# Patient Record
Sex: MALE | Race: WHITE | Employment: OTHER | URBAN - METROPOLITAN AREA
[De-identification: names, ages, dates, MRNs, and addresses within clinical notes are randomized per-mention and may not be internally consistent; named-entity substitution may affect disease eponyms.]

---

## 2018-08-28 ENCOUNTER — HOSPITAL ENCOUNTER (OUTPATIENT)
Age: 83
Discharge: HOME | End: 2018-08-30
Payer: MEDICARE

## 2018-08-28 LAB — PROSTATE SPECIFIC ANTIGEN: 2.45 NG/ML (ref 0–4)

## 2018-08-28 PROCEDURE — 84153 ASSAY OF PSA TOTAL: CPT

## 2019-02-19 ENCOUNTER — HOSPITAL ENCOUNTER (OUTPATIENT)
Age: 84
Discharge: HOME | End: 2019-02-21
Payer: MEDICARE

## 2019-02-19 PROCEDURE — G0103 PSA SCREENING: HCPCS

## 2019-02-19 PROCEDURE — 84153 ASSAY OF PSA TOTAL: CPT

## 2019-03-04 LAB
PROSTATE SPECIFIC ANTIGEN: 1.61 NG/ML (ref 0–4)
PROSTATE SPECIFIC ANTIGEN: ABNORMAL NG/ML (ref 0–4)

## 2020-01-01 ENCOUNTER — HOSPITAL ENCOUNTER (OUTPATIENT)
Dept: RADIATION ONCOLOGY | Age: 85
Discharge: HOME | End: 2020-05-14
Attending: RADIOLOGY
Payer: MEDICARE

## 2020-01-01 ENCOUNTER — ANESTHESIA EVENT (OUTPATIENT)
Dept: CT IMAGING | Age: 85
End: 2020-01-01

## 2020-01-01 ENCOUNTER — HOSPITAL ENCOUNTER (OUTPATIENT)
Dept: RADIATION ONCOLOGY | Age: 85
End: 2020-01-01
Attending: RADIOLOGY
Payer: MEDICARE

## 2020-01-01 ENCOUNTER — APPOINTMENT (OUTPATIENT)
Dept: RADIATION ONCOLOGY | Age: 85
End: 2020-01-01
Attending: RADIOLOGY
Payer: MEDICARE

## 2020-01-01 ENCOUNTER — HOSPITAL ENCOUNTER (OUTPATIENT)
Dept: RADIATION ONCOLOGY | Age: 85
Discharge: HOME | End: 2020-05-04
Attending: RADIOLOGY
Payer: MEDICARE

## 2020-01-01 ENCOUNTER — HOSPITAL ENCOUNTER (OUTPATIENT)
Dept: RADIATION ONCOLOGY | Age: 85
Discharge: HOME | End: 2020-04-01
Attending: RADIOLOGY
Payer: MEDICARE

## 2020-01-01 ENCOUNTER — HOSPITAL ENCOUNTER (OUTPATIENT)
Dept: RADIATION ONCOLOGY | Age: 85
Discharge: HOME | End: 2020-04-30
Attending: RADIOLOGY
Payer: MEDICARE

## 2020-01-01 ENCOUNTER — CLINICAL DOCUMENTATION (OUTPATIENT)
Dept: RADIATION ONCOLOGY | Age: 85
End: 2020-01-01

## 2020-01-01 ENCOUNTER — HOSPITAL ENCOUNTER (OUTPATIENT)
Dept: CT IMAGING | Age: 85
Discharge: HOME | End: 2020-04-05
Payer: MEDICARE

## 2020-01-01 ENCOUNTER — HOSPITAL ENCOUNTER (OUTPATIENT)
Dept: RADIATION ONCOLOGY | Age: 85
Discharge: HOME | End: 2020-05-20
Attending: RADIOLOGY
Payer: MEDICARE

## 2020-01-01 ENCOUNTER — HOSPITAL ENCOUNTER (OUTPATIENT)
Dept: CT IMAGING | Age: 85
Discharge: HOME | End: 2020-03-05
Payer: MEDICARE

## 2020-01-01 ENCOUNTER — HOSPITAL ENCOUNTER (OUTPATIENT)
Dept: RADIATION ONCOLOGY | Age: 85
Discharge: HOME | End: 2020-05-27
Attending: RADIOLOGY
Payer: MEDICARE

## 2020-01-01 ENCOUNTER — TELEPHONE (OUTPATIENT)
Dept: RADIATION ONCOLOGY | Age: 85
End: 2020-01-01

## 2020-01-01 ENCOUNTER — HOSPITAL ENCOUNTER (OUTPATIENT)
Dept: RADIATION ONCOLOGY | Age: 85
Discharge: HOME | End: 2020-04-27
Attending: RADIOLOGY
Payer: MEDICARE

## 2020-01-01 ENCOUNTER — TELEPHONE (OUTPATIENT)
Dept: CASE MANAGEMENT | Age: 85
End: 2020-01-01

## 2020-01-01 ENCOUNTER — HOSPITAL ENCOUNTER (OUTPATIENT)
Dept: RADIATION ONCOLOGY | Age: 85
Discharge: HOME | End: 2020-05-11
Attending: RADIOLOGY
Payer: MEDICARE

## 2020-01-01 ENCOUNTER — HOSPITAL ENCOUNTER (OUTPATIENT)
Dept: RADIATION ONCOLOGY | Age: 85
Discharge: HOME | End: 2020-02-18
Payer: MEDICARE

## 2020-01-01 ENCOUNTER — CARE COORDINATION (OUTPATIENT)
Dept: CASE MANAGEMENT | Age: 85
End: 2020-01-01

## 2020-01-01 ENCOUNTER — HOSPITAL ENCOUNTER (OUTPATIENT)
Dept: RADIATION ONCOLOGY | Age: 85
Discharge: HOME | End: 2020-05-19
Attending: RADIOLOGY
Payer: MEDICARE

## 2020-01-01 ENCOUNTER — HOSPITAL ENCOUNTER (OUTPATIENT)
Dept: RADIATION ONCOLOGY | Age: 85
Discharge: HOME | End: 2020-04-29
Attending: RADIOLOGY
Payer: MEDICARE

## 2020-01-01 ENCOUNTER — HOSPITAL ENCOUNTER (OUTPATIENT)
Dept: RADIATION ONCOLOGY | Age: 85
Discharge: HOME | End: 2020-05-06
Attending: RADIOLOGY
Payer: MEDICARE

## 2020-01-01 ENCOUNTER — APPOINTMENT (OUTPATIENT)
Dept: GENERAL RADIOLOGY | Age: 85
DRG: 637 | End: 2020-01-01
Payer: MEDICARE

## 2020-01-01 ENCOUNTER — HOSPITAL ENCOUNTER (OUTPATIENT)
Dept: RADIATION ONCOLOGY | Age: 85
Discharge: HOME | End: 2020-05-05
Attending: RADIOLOGY
Payer: MEDICARE

## 2020-01-01 ENCOUNTER — HOSPITAL ENCOUNTER (INPATIENT)
Age: 85
LOS: 5 days | Discharge: HOME | DRG: 637 | End: 2020-04-22
Attending: EMERGENCY MEDICINE | Admitting: FAMILY MEDICINE
Payer: MEDICARE

## 2020-01-01 ENCOUNTER — HOSPITAL ENCOUNTER (OUTPATIENT)
Dept: RADIATION ONCOLOGY | Age: 85
Discharge: HOME | End: 2020-05-07
Attending: RADIOLOGY
Payer: MEDICARE

## 2020-01-01 ENCOUNTER — HOSPITAL ENCOUNTER (OUTPATIENT)
Dept: RADIATION ONCOLOGY | Age: 85
Discharge: HOME | End: 2020-05-08
Attending: RADIOLOGY
Payer: MEDICARE

## 2020-01-01 ENCOUNTER — HOSPITAL ENCOUNTER (OUTPATIENT)
Dept: RADIATION ONCOLOGY | Age: 85
Discharge: HOME | End: 2020-05-15
Attending: RADIOLOGY
Payer: MEDICARE

## 2020-01-01 ENCOUNTER — HOSPITAL ENCOUNTER (OUTPATIENT)
Dept: RADIATION ONCOLOGY | Age: 85
Discharge: HOME | End: 2020-05-01
Attending: RADIOLOGY
Payer: MEDICARE

## 2020-01-01 ENCOUNTER — HOSPITAL ENCOUNTER (OUTPATIENT)
Dept: RADIATION ONCOLOGY | Age: 85
Discharge: HOME | End: 2020-05-18
Attending: RADIOLOGY
Payer: MEDICARE

## 2020-01-01 ENCOUNTER — APPOINTMENT (OUTPATIENT)
Dept: ULTRASOUND IMAGING | Age: 85
DRG: 637 | End: 2020-01-01
Payer: MEDICARE

## 2020-01-01 ENCOUNTER — HOSPITAL ENCOUNTER (OUTPATIENT)
Dept: RADIATION ONCOLOGY | Age: 85
Discharge: HOME | End: 2020-05-22
Attending: RADIOLOGY
Payer: MEDICARE

## 2020-01-01 ENCOUNTER — HOSPITAL ENCOUNTER (OUTPATIENT)
Dept: GENERAL RADIOLOGY | Age: 85
Discharge: HOME | End: 2020-03-13
Payer: MEDICARE

## 2020-01-01 ENCOUNTER — HOSPITAL ENCOUNTER (OUTPATIENT)
Dept: RADIATION ONCOLOGY | Age: 85
Discharge: HOME | End: 2020-05-13
Attending: RADIOLOGY
Payer: MEDICARE

## 2020-01-01 ENCOUNTER — HOSPITAL ENCOUNTER (OUTPATIENT)
Dept: CT IMAGING | Age: 85
Discharge: HOME | End: 2020-03-13
Payer: MEDICARE

## 2020-01-01 ENCOUNTER — HOSPITAL ENCOUNTER (OUTPATIENT)
Dept: NUCLEAR MEDICINE | Age: 85
Discharge: HOME | End: 2020-02-27
Payer: MEDICARE

## 2020-01-01 ENCOUNTER — APPOINTMENT (OUTPATIENT)
Dept: CT IMAGING | Age: 85
DRG: 637 | End: 2020-01-01
Payer: MEDICARE

## 2020-01-01 ENCOUNTER — HOSPITAL ENCOUNTER (OUTPATIENT)
Dept: RADIATION ONCOLOGY | Age: 85
Discharge: HOME | End: 2020-05-21
Attending: RADIOLOGY
Payer: MEDICARE

## 2020-01-01 ENCOUNTER — HOSPITAL ENCOUNTER (OUTPATIENT)
Dept: RADIATION ONCOLOGY | Age: 85
Discharge: HOME | End: 2020-05-12
Attending: RADIOLOGY
Payer: MEDICARE

## 2020-01-01 ENCOUNTER — ANESTHESIA (OUTPATIENT)
Dept: CT IMAGING | Age: 85
End: 2020-01-01

## 2020-01-01 VITALS
HEIGHT: 72 IN | TEMPERATURE: 96.4 F | WEIGHT: 211 LBS | HEART RATE: 81 BPM | SYSTOLIC BLOOD PRESSURE: 134 MMHG | RESPIRATION RATE: 18 BRPM | DIASTOLIC BLOOD PRESSURE: 87 MMHG | OXYGEN SATURATION: 96 % | BODY MASS INDEX: 28.58 KG/M2

## 2020-01-01 VITALS
HEART RATE: 73 BPM | OXYGEN SATURATION: 96 % | WEIGHT: 211.13 LBS | SYSTOLIC BLOOD PRESSURE: 124 MMHG | TEMPERATURE: 98.2 F | BODY MASS INDEX: 28.63 KG/M2 | DIASTOLIC BLOOD PRESSURE: 60 MMHG | RESPIRATION RATE: 20 BRPM

## 2020-01-01 VITALS
TEMPERATURE: 97.1 F | RESPIRATION RATE: 20 BRPM | HEART RATE: 67 BPM | OXYGEN SATURATION: 97 % | WEIGHT: 206 LBS | DIASTOLIC BLOOD PRESSURE: 56 MMHG | BODY MASS INDEX: 27.94 KG/M2 | SYSTOLIC BLOOD PRESSURE: 110 MMHG

## 2020-01-01 VITALS
TEMPERATURE: 98.5 F | HEART RATE: 86 BPM | BODY MASS INDEX: 28.25 KG/M2 | RESPIRATION RATE: 20 BRPM | DIASTOLIC BLOOD PRESSURE: 54 MMHG | WEIGHT: 208.31 LBS | OXYGEN SATURATION: 95 % | SYSTOLIC BLOOD PRESSURE: 110 MMHG

## 2020-01-01 VITALS
BODY MASS INDEX: 28.36 KG/M2 | TEMPERATURE: 96.6 F | OXYGEN SATURATION: 96 % | DIASTOLIC BLOOD PRESSURE: 56 MMHG | WEIGHT: 209.13 LBS | SYSTOLIC BLOOD PRESSURE: 102 MMHG | HEART RATE: 91 BPM | RESPIRATION RATE: 20 BRPM

## 2020-01-01 VITALS
HEIGHT: 72 IN | WEIGHT: 211 LBS | BODY MASS INDEX: 28.58 KG/M2 | DIASTOLIC BLOOD PRESSURE: 71 MMHG | HEART RATE: 75 BPM | RESPIRATION RATE: 18 BRPM | TEMPERATURE: 97.7 F | SYSTOLIC BLOOD PRESSURE: 147 MMHG | OXYGEN SATURATION: 96 %

## 2020-01-01 VITALS
SYSTOLIC BLOOD PRESSURE: 153 MMHG | HEIGHT: 72 IN | OXYGEN SATURATION: 97 % | BODY MASS INDEX: 28.58 KG/M2 | DIASTOLIC BLOOD PRESSURE: 86 MMHG | RESPIRATION RATE: 20 BRPM | WEIGHT: 211 LBS | HEART RATE: 107 BPM | TEMPERATURE: 98.1 F

## 2020-01-01 VITALS
DIASTOLIC BLOOD PRESSURE: 58 MMHG | OXYGEN SATURATION: 92 % | SYSTOLIC BLOOD PRESSURE: 112 MMHG | BODY MASS INDEX: 28.63 KG/M2 | WEIGHT: 211.13 LBS | HEART RATE: 87 BPM | TEMPERATURE: 97.5 F | RESPIRATION RATE: 20 BRPM

## 2020-01-01 VITALS — DIASTOLIC BLOOD PRESSURE: 86 MMHG | SYSTOLIC BLOOD PRESSURE: 136 MMHG | OXYGEN SATURATION: 96 %

## 2020-01-01 LAB
ALBUMIN SERPL-MCNC: 3.2 G/DL (ref 3.5–5.2)
ALBUMIN SERPL-MCNC: 3.3 G/DL (ref 3.5–5.2)
ALBUMIN SERPL-MCNC: 3.3 G/DL (ref 3.5–5.2)
ALBUMIN SERPL-MCNC: 3.5 G/DL (ref 3.5–5.2)
ALBUMIN SERPL-MCNC: 3.6 G/DL (ref 3.5–5.2)
ALBUMIN SERPL-MCNC: 3.6 G/DL (ref 3.5–5.2)
ALP BLD-CCNC: 43 U/L (ref 40–129)
ALP BLD-CCNC: 46 U/L (ref 40–129)
ALP BLD-CCNC: 51 U/L (ref 40–129)
ALP BLD-CCNC: 58 U/L (ref 40–129)
ALP BLD-CCNC: 59 U/L (ref 40–129)
ALP BLD-CCNC: 59 U/L (ref 40–129)
ALT SERPL-CCNC: 12 U/L (ref 0–40)
ALT SERPL-CCNC: 13 U/L (ref 0–40)
ALT SERPL-CCNC: 15 U/L (ref 0–40)
ALT SERPL-CCNC: 18 U/L (ref 0–40)
ANION GAP SERPL CALCULATED.3IONS-SCNC: 10 MMOL/L (ref 7–16)
ANION GAP SERPL CALCULATED.3IONS-SCNC: 11 MMOL/L (ref 7–16)
ANION GAP SERPL CALCULATED.3IONS-SCNC: 8 MMOL/L (ref 7–16)
ANISOCYTOSIS: ABNORMAL
APTT: 34.6 SEC (ref 24.5–35.1)
AST SERPL-CCNC: 15 U/L (ref 0–39)
AST SERPL-CCNC: 17 U/L (ref 0–39)
AST SERPL-CCNC: 17 U/L (ref 0–39)
AST SERPL-CCNC: 19 U/L (ref 0–39)
AST SERPL-CCNC: 19 U/L (ref 0–39)
AST SERPL-CCNC: 23 U/L (ref 0–39)
B.E.: -5.9 MMOL/L (ref -3–3)
B.E.: -6.7 MMOL/L (ref -3–3)
B.E.: -9.4 MMOL/L (ref -3–3)
BACTERIA: NORMAL /HPF
BASOPHILS ABSOLUTE: 0 E9/L (ref 0–0.2)
BASOPHILS ABSOLUTE: 0.01 E9/L (ref 0–0.2)
BASOPHILS ABSOLUTE: 0.01 E9/L (ref 0–0.2)
BASOPHILS ABSOLUTE: 0.08 E9/L (ref 0–0.2)
BASOPHILS RELATIVE PERCENT: 0 % (ref 0–2)
BASOPHILS RELATIVE PERCENT: 0 % (ref 0–2)
BASOPHILS RELATIVE PERCENT: 0.1 % (ref 0–2)
BASOPHILS RELATIVE PERCENT: 0.1 % (ref 0–2)
BASOPHILS RELATIVE PERCENT: 0.2 % (ref 0–2)
BASOPHILS RELATIVE PERCENT: 1 % (ref 0–2)
BILIRUB SERPL-MCNC: 0.3 MG/DL (ref 0–1.2)
BILIRUB SERPL-MCNC: 0.4 MG/DL (ref 0–1.2)
BILIRUB SERPL-MCNC: 0.4 MG/DL (ref 0–1.2)
BILIRUB SERPL-MCNC: 0.5 MG/DL (ref 0–1.2)
BILIRUB SERPL-MCNC: 0.5 MG/DL (ref 0–1.2)
BILIRUB SERPL-MCNC: 0.7 MG/DL (ref 0–1.2)
BILIRUBIN DIRECT: 0.3 MG/DL (ref 0–0.3)
BILIRUBIN URINE: NEGATIVE
BILIRUBIN, INDIRECT: 0.1 MG/DL (ref 0–1)
BLOOD CULTURE, ROUTINE: NORMAL
BLOOD, URINE: NEGATIVE
BUN BLDV-MCNC: 23 MG/DL (ref 8–23)
BUN BLDV-MCNC: 51 MG/DL (ref 8–23)
BUN BLDV-MCNC: 53 MG/DL (ref 8–23)
BUN BLDV-MCNC: 55 MG/DL (ref 8–23)
BUN BLDV-MCNC: 57 MG/DL (ref 8–23)
BUN BLDV-MCNC: 58 MG/DL (ref 8–23)
BUN BLDV-MCNC: 60 MG/DL (ref 8–23)
BUN BLDV-MCNC: 68 MG/DL (ref 8–23)
BURR CELLS: ABNORMAL
CALCIUM SERPL-MCNC: 10.2 MG/DL (ref 8.6–10.2)
CALCIUM SERPL-MCNC: 8.7 MG/DL (ref 8.6–10.2)
CALCIUM SERPL-MCNC: 8.8 MG/DL (ref 8.6–10.2)
CALCIUM SERPL-MCNC: 9 MG/DL (ref 8.6–10.2)
CALCIUM SERPL-MCNC: 9.1 MG/DL (ref 8.6–10.2)
CALCIUM SERPL-MCNC: 9.1 MG/DL (ref 8.6–10.2)
CALCIUM SERPL-MCNC: 9.3 MG/DL (ref 8.6–10.2)
CALCIUM SERPL-MCNC: 9.8 MG/DL (ref 8.6–10.2)
CHLORIDE BLD-SCNC: 101 MMOL/L (ref 98–107)
CHLORIDE BLD-SCNC: 101 MMOL/L (ref 98–107)
CHLORIDE BLD-SCNC: 106 MMOL/L (ref 98–107)
CHLORIDE BLD-SCNC: 106 MMOL/L (ref 98–107)
CHLORIDE BLD-SCNC: 94 MMOL/L (ref 98–107)
CHLORIDE BLD-SCNC: 97 MMOL/L (ref 98–107)
CHLORIDE BLD-SCNC: 98 MMOL/L (ref 98–107)
CHLORIDE BLD-SCNC: 99 MMOL/L (ref 98–107)
CLARITY: CLEAR
CO2: 21 MMOL/L (ref 22–29)
CO2: 22 MMOL/L (ref 22–29)
CO2: 22 MMOL/L (ref 22–29)
CO2: 23 MMOL/L (ref 22–29)
CO2: 25 MMOL/L (ref 22–29)
CO2: 26 MMOL/L (ref 22–29)
CO2: 29 MMOL/L (ref 22–29)
CO2: 33 MMOL/L (ref 22–29)
COHB: 0.3 % (ref 0–1.5)
COHB: 0.4 % (ref 0–1.5)
COHB: 0.9 % (ref 0–1.5)
COHB: 0.9 % (ref 0–1.5)
COLOR: YELLOW
COMMENT: ABNORMAL
CREAT SERPL-MCNC: 1.3 MG/DL (ref 0.7–1.2)
CREAT SERPL-MCNC: 1.6 MG/DL (ref 0.7–1.2)
CREAT SERPL-MCNC: 1.8 MG/DL (ref 0.7–1.2)
CREAT SERPL-MCNC: 1.8 MG/DL (ref 0.7–1.2)
CREAT SERPL-MCNC: 1.9 MG/DL (ref 0.7–1.2)
CREAT SERPL-MCNC: 2 MG/DL (ref 0.7–1.2)
CREAT SERPL-MCNC: 2.1 MG/DL (ref 0.7–1.2)
CREAT SERPL-MCNC: 2.2 MG/DL (ref 0.7–1.2)
CRITICAL: ABNORMAL
CULTURE, BLOOD 2: NORMAL
D DIMER: 2794 NG/ML DDU
DATE ANALYZED: ABNORMAL
DATE OF COLLECTION: ABNORMAL
EKG ATRIAL RATE: 101 BPM
EKG ATRIAL RATE: 129 BPM
EKG ATRIAL RATE: 75 BPM
EKG ATRIAL RATE: 96 BPM
EKG P AXIS: 2 DEGREES
EKG P AXIS: 53 DEGREES
EKG P AXIS: 80 DEGREES
EKG P-R INTERVAL: 100 MS
EKG P-R INTERVAL: 166 MS
EKG P-R INTERVAL: 186 MS
EKG P-R INTERVAL: 252 MS
EKG Q-T INTERVAL: 284 MS
EKG Q-T INTERVAL: 304 MS
EKG Q-T INTERVAL: 312 MS
EKG Q-T INTERVAL: 366 MS
EKG QRS DURATION: 100 MS
EKG QRS DURATION: 84 MS
EKG QRS DURATION: 86 MS
EKG QRS DURATION: 94 MS
EKG QTC CALCULATION (BAZETT): 384 MS
EKG QTC CALCULATION (BAZETT): 404 MS
EKG QTC CALCULATION (BAZETT): 408 MS
EKG QTC CALCULATION (BAZETT): 412 MS
EKG R AXIS: 29 DEGREES
EKG R AXIS: 36 DEGREES
EKG R AXIS: 44 DEGREES
EKG R AXIS: 74 DEGREES
EKG T AXIS: 108 DEGREES
EKG T AXIS: 110 DEGREES
EKG T AXIS: 117 DEGREES
EKG T AXIS: 134 DEGREES
EKG VENTRICULAR RATE: 101 BPM
EKG VENTRICULAR RATE: 127 BPM
EKG VENTRICULAR RATE: 75 BPM
EKG VENTRICULAR RATE: 96 BPM
EOSINOPHILS ABSOLUTE: 0 E9/L (ref 0.05–0.5)
EOSINOPHILS ABSOLUTE: 0.01 E9/L (ref 0.05–0.5)
EOSINOPHILS ABSOLUTE: 0.02 E9/L (ref 0.05–0.5)
EOSINOPHILS ABSOLUTE: 0.19 E9/L (ref 0.05–0.5)
EOSINOPHILS RELATIVE PERCENT: 0 % (ref 0–6)
EOSINOPHILS RELATIVE PERCENT: 0.2 % (ref 0–6)
EOSINOPHILS RELATIVE PERCENT: 0.2 % (ref 0–6)
EOSINOPHILS RELATIVE PERCENT: 2.4 % (ref 0–6)
GFR AFRICAN AMERICAN: 35
GFR AFRICAN AMERICAN: 36
GFR AFRICAN AMERICAN: 39
GFR AFRICAN AMERICAN: 41
GFR AFRICAN AMERICAN: 44
GFR AFRICAN AMERICAN: 44
GFR AFRICAN AMERICAN: 50
GFR AFRICAN AMERICAN: >60
GFR NON-AFRICAN AMERICAN: 29 ML/MIN/1.73
GFR NON-AFRICAN AMERICAN: 30 ML/MIN/1.73
GFR NON-AFRICAN AMERICAN: 32 ML/MIN/1.73
GFR NON-AFRICAN AMERICAN: 34 ML/MIN/1.73
GFR NON-AFRICAN AMERICAN: 36 ML/MIN/1.73
GFR NON-AFRICAN AMERICAN: 36 ML/MIN/1.73
GFR NON-AFRICAN AMERICAN: 41 ML/MIN/1.73
GFR NON-AFRICAN AMERICAN: 52 ML/MIN/1.73
GLUCOSE BLD-MCNC: 152 MG/DL (ref 74–99)
GLUCOSE BLD-MCNC: 153 MG/DL (ref 74–99)
GLUCOSE BLD-MCNC: 174 MG/DL (ref 74–99)
GLUCOSE BLD-MCNC: 178 MG/DL (ref 74–99)
GLUCOSE BLD-MCNC: 211 MG/DL (ref 74–99)
GLUCOSE BLD-MCNC: 65 MG/DL (ref 74–99)
GLUCOSE BLD-MCNC: 84 MG/DL (ref 74–99)
GLUCOSE BLD-MCNC: 92 MG/DL (ref 74–99)
GLUCOSE URINE: NEGATIVE MG/DL
HCO3: 20.4 MMOL/L (ref 22–26)
HCO3: 21.4 MMOL/L (ref 22–26)
HCO3: 23 MMOL/L (ref 22–26)
HCT VFR BLD CALC: 31.9 % (ref 37–54)
HCT VFR BLD CALC: 34.7 % (ref 37–54)
HCT VFR BLD CALC: 35.1 % (ref 37–54)
HCT VFR BLD CALC: 36.1 % (ref 37–54)
HCT VFR BLD CALC: 37.5 % (ref 37–54)
HCT VFR BLD CALC: 39 % (ref 37–54)
HCT VFR BLD CALC: 40.3 % (ref 37–54)
HEMOGLOBIN: 10 G/DL (ref 12.5–16.5)
HEMOGLOBIN: 10.7 G/DL (ref 12.5–16.5)
HEMOGLOBIN: 10.8 G/DL (ref 12.5–16.5)
HEMOGLOBIN: 11.1 G/DL (ref 12.5–16.5)
HEMOGLOBIN: 11.4 G/DL (ref 12.5–16.5)
HEMOGLOBIN: 11.5 G/DL (ref 12.5–16.5)
HEMOGLOBIN: 12.6 G/DL (ref 12.5–16.5)
HHB: 0.3 % (ref 0–5)
HHB: 0.5 % (ref 0–5)
HHB: 0.6 % (ref 0–5)
HHB: 4.8 % (ref 0–5)
HYPOCHROMIA: ABNORMAL
IMMATURE GRANULOCYTES #: 0.04 E9/L
IMMATURE GRANULOCYTES #: 0.08 E9/L
IMMATURE GRANULOCYTES #: 0.09 E9/L
IMMATURE GRANULOCYTES #: 0.09 E9/L
IMMATURE GRANULOCYTES %: 0.6 % (ref 0–5)
IMMATURE GRANULOCYTES %: 0.8 % (ref 0–5)
IMMATURE GRANULOCYTES %: 0.9 % (ref 0–5)
IMMATURE GRANULOCYTES %: 1.1 % (ref 0–5)
INR BLD: 1.1
INR BLD: 1.2
KETONES, URINE: NEGATIVE MG/DL
LAB: ABNORMAL
LACTIC ACID, SEPSIS: 1 MMOL/L (ref 0.5–1.9)
LACTIC ACID, SEPSIS: 1.3 MMOL/L (ref 0.5–1.9)
LACTIC ACID: 1 MMOL/L (ref 0.5–2.2)
LEUKOCYTE ESTERASE, URINE: NEGATIVE
LIPASE: 55 U/L (ref 13–60)
LV EF: 43 %
LVEF MODALITY: NORMAL
LYMPHOCYTES ABSOLUTE: 0.08 E9/L (ref 1.5–4)
LYMPHOCYTES ABSOLUTE: 0.38 E9/L (ref 1.5–4)
LYMPHOCYTES ABSOLUTE: 0.44 E9/L (ref 1.5–4)
LYMPHOCYTES ABSOLUTE: 0.45 E9/L (ref 1.5–4)
LYMPHOCYTES ABSOLUTE: 1.04 E9/L (ref 1.5–4)
LYMPHOCYTES ABSOLUTE: 1.32 E9/L (ref 1.5–4)
LYMPHOCYTES RELATIVE PERCENT: 0.9 % (ref 20–42)
LYMPHOCYTES RELATIVE PERCENT: 10.3 % (ref 20–42)
LYMPHOCYTES RELATIVE PERCENT: 16.4 % (ref 20–42)
LYMPHOCYTES RELATIVE PERCENT: 3.9 % (ref 20–42)
LYMPHOCYTES RELATIVE PERCENT: 4.4 % (ref 20–42)
LYMPHOCYTES RELATIVE PERCENT: 7 % (ref 20–42)
Lab: ABNORMAL
MAGNESIUM: 1.8 MG/DL (ref 1.6–2.6)
MCH RBC QN AUTO: 28.8 PG (ref 26–35)
MCH RBC QN AUTO: 29.2 PG (ref 26–35)
MCH RBC QN AUTO: 29.3 PG (ref 26–35)
MCH RBC QN AUTO: 29.3 PG (ref 26–35)
MCH RBC QN AUTO: 29.4 PG (ref 26–35)
MCH RBC QN AUTO: 29.6 PG (ref 26–35)
MCH RBC QN AUTO: 29.6 PG (ref 26–35)
MCHC RBC AUTO-ENTMCNC: 29.5 % (ref 32–34.5)
MCHC RBC AUTO-ENTMCNC: 29.6 % (ref 32–34.5)
MCHC RBC AUTO-ENTMCNC: 30.5 % (ref 32–34.5)
MCHC RBC AUTO-ENTMCNC: 31.1 % (ref 32–34.5)
MCHC RBC AUTO-ENTMCNC: 31.3 % (ref 32–34.5)
MCHC RBC AUTO-ENTMCNC: 31.3 % (ref 32–34.5)
MCHC RBC AUTO-ENTMCNC: 31.6 % (ref 32–34.5)
MCV RBC AUTO: 93.5 FL (ref 80–99.9)
MCV RBC AUTO: 93.5 FL (ref 80–99.9)
MCV RBC AUTO: 93.8 FL (ref 80–99.9)
MCV RBC AUTO: 94 FL (ref 80–99.9)
MCV RBC AUTO: 97.2 FL (ref 80–99.9)
MCV RBC AUTO: 97.5 FL (ref 80–99.9)
MCV RBC AUTO: 99.2 FL (ref 80–99.9)
METER GLUCOSE: 100 MG/DL (ref 74–99)
METER GLUCOSE: 102 MG/DL (ref 74–99)
METER GLUCOSE: 106 MG/DL (ref 74–99)
METER GLUCOSE: 114 MG/DL (ref 74–99)
METER GLUCOSE: 117 MG/DL (ref 74–99)
METER GLUCOSE: 121 MG/DL (ref 74–99)
METER GLUCOSE: 142 MG/DL (ref 74–99)
METER GLUCOSE: 164 MG/DL (ref 74–99)
METER GLUCOSE: 187 MG/DL (ref 74–99)
METER GLUCOSE: 191 MG/DL (ref 74–99)
METER GLUCOSE: 204 MG/DL (ref 74–99)
METER GLUCOSE: 234 MG/DL (ref 74–99)
METER GLUCOSE: 255 MG/DL (ref 74–99)
METER GLUCOSE: 257 MG/DL (ref 74–99)
METER GLUCOSE: 280 MG/DL (ref 74–99)
METER GLUCOSE: 300 MG/DL (ref 74–99)
METER GLUCOSE: 52 MG/DL (ref 74–99)
METER GLUCOSE: 58 MG/DL (ref 74–99)
METER GLUCOSE: 62 MG/DL (ref 74–99)
METER GLUCOSE: 67 MG/DL (ref 74–99)
METER GLUCOSE: 69 MG/DL (ref 74–99)
METER GLUCOSE: 72 MG/DL (ref 74–99)
METER GLUCOSE: 73 MG/DL (ref 74–99)
METER GLUCOSE: 77 MG/DL (ref 74–99)
METER GLUCOSE: 78 MG/DL (ref 74–99)
METER GLUCOSE: 81 MG/DL (ref 74–99)
METER GLUCOSE: 82 MG/DL (ref 74–99)
METER GLUCOSE: 84 MG/DL (ref 74–99)
METER GLUCOSE: 92 MG/DL (ref 74–99)
METER GLUCOSE: 93 MG/DL (ref 74–99)
METER GLUCOSE: <40 MG/DL (ref 74–99)
METHB: 0.2 % (ref 0–1.5)
METHB: 0.2 % (ref 0–1.5)
METHB: 0.3 % (ref 0–1.5)
METHB: 0.3 % (ref 0–1.5)
MODE: ABNORMAL
MONOCYTES ABSOLUTE: 0.12 E9/L (ref 0.1–0.95)
MONOCYTES ABSOLUTE: 0.23 E9/L (ref 0.1–0.95)
MONOCYTES ABSOLUTE: 0.36 E9/L (ref 0.1–0.95)
MONOCYTES ABSOLUTE: 0.73 E9/L (ref 0.1–0.95)
MONOCYTES ABSOLUTE: 1.09 E9/L (ref 0.1–0.95)
MONOCYTES ABSOLUTE: 1.23 E9/L (ref 0.1–0.95)
MONOCYTES RELATIVE PERCENT: 1.9 % (ref 2–12)
MONOCYTES RELATIVE PERCENT: 12.2 % (ref 2–12)
MONOCYTES RELATIVE PERCENT: 2.6 % (ref 2–12)
MONOCYTES RELATIVE PERCENT: 3.7 % (ref 2–12)
MONOCYTES RELATIVE PERCENT: 9.1 % (ref 2–12)
MONOCYTES RELATIVE PERCENT: 9.6 % (ref 2–12)
NEUTROPHILS ABSOLUTE: 5.65 E9/L (ref 1.8–7.3)
NEUTROPHILS ABSOLUTE: 5.83 E9/L (ref 1.8–7.3)
NEUTROPHILS ABSOLUTE: 7.47 E9/L (ref 1.8–7.3)
NEUTROPHILS ABSOLUTE: 7.68 E9/L (ref 1.8–7.3)
NEUTROPHILS ABSOLUTE: 8.96 E9/L (ref 1.8–7.3)
NEUTROPHILS ABSOLUTE: 9.37 E9/L (ref 1.8–7.3)
NEUTROPHILS RELATIVE PERCENT: 70 % (ref 43–80)
NEUTROPHILS RELATIVE PERCENT: 76.4 % (ref 43–80)
NEUTROPHILS RELATIVE PERCENT: 86 % (ref 43–80)
NEUTROPHILS RELATIVE PERCENT: 90.1 % (ref 43–80)
NEUTROPHILS RELATIVE PERCENT: 91.5 % (ref 43–80)
NEUTROPHILS RELATIVE PERCENT: 96.5 % (ref 43–80)
NITRITE, URINE: NEGATIVE
O2 CONTENT: 15.5 ML/DL
O2 CONTENT: 17.5 ML/DL
O2 CONTENT: 17.7 ML/DL
O2 CONTENT: 17.9 ML/DL
O2 SATURATION: 95.2 % (ref 92–98.5)
O2 SATURATION: 99.4 % (ref 92–98.5)
O2 SATURATION: 99.5 % (ref 92–98.5)
O2 SATURATION: 99.7 % (ref 92–98.5)
O2HB: 94.6 % (ref 94–97)
O2HB: 98.3 % (ref 94–97)
O2HB: 98.3 % (ref 94–97)
O2HB: 99.1 % (ref 94–97)
OPERATOR ID: 2860
OPERATOR ID: 659
OVALOCYTES: ABNORMAL
PATIENT TEMP: 37 C
PCO2: 47.4 MMHG (ref 35–45)
PCO2: 62.7 MMHG (ref 35–45)
PCO2: 72.6 MMHG (ref 35–45)
PCO2: ABNORMAL MMHG (ref 35–45)
PDW BLD-RTO: 13.6 FL (ref 11.5–15)
PDW BLD-RTO: 13.7 FL (ref 11.5–15)
PDW BLD-RTO: 13.9 FL (ref 11.5–15)
PDW BLD-RTO: 14.1 FL (ref 11.5–15)
PDW BLD-RTO: 14.1 FL (ref 11.5–15)
PEEP/CPAP: 10 CMH2O
PH BLOOD GAS: 7.09 (ref 7.35–7.45)
PH BLOOD GAS: 7.18 (ref 7.35–7.45)
PH BLOOD GAS: 7.25 (ref 7.35–7.45)
PH BLOOD GAS: ABNORMAL (ref 7.35–7.45)
PH UA: 5 (ref 5–9)
PLATELET # BLD: 171 E9/L (ref 130–450)
PLATELET # BLD: 180 E9/L (ref 130–450)
PLATELET # BLD: 181 E9/L (ref 130–450)
PLATELET # BLD: 187 E9/L (ref 130–450)
PLATELET # BLD: 205 E9/L (ref 130–450)
PLATELET # BLD: 227 E9/L (ref 130–450)
PLATELET # BLD: 232 E9/L (ref 130–450)
PMV BLD AUTO: 10 FL (ref 7–12)
PMV BLD AUTO: 10.2 FL (ref 7–12)
PMV BLD AUTO: 10.4 FL (ref 7–12)
PMV BLD AUTO: 10.6 FL (ref 7–12)
PMV BLD AUTO: 10.7 FL (ref 7–12)
PMV BLD AUTO: 10.9 FL (ref 7–12)
PMV BLD AUTO: 9.7 FL (ref 7–12)
PO2: 243 MMHG (ref 75–100)
PO2: 272.5 MMHG (ref 75–100)
PO2: 350.7 MMHG (ref 75–100)
PO2: 75 MMHG (ref 75–100)
POIKILOCYTES: ABNORMAL
POTASSIUM REFLEX MAGNESIUM: 6.1 MMOL/L (ref 3.5–5)
POTASSIUM SERPL-SCNC: 5.1 MMOL/L (ref 3.5–5)
POTASSIUM SERPL-SCNC: 5.1 MMOL/L (ref 3.5–5)
POTASSIUM SERPL-SCNC: 5.4 MMOL/L (ref 3.5–5)
POTASSIUM SERPL-SCNC: 5.5 MMOL/L (ref 3.5–5)
POTASSIUM SERPL-SCNC: 5.67 MMOL/L (ref 3.5–5)
POTASSIUM SERPL-SCNC: 5.7 MMOL/L (ref 3.5–5)
POTASSIUM SERPL-SCNC: 5.79 MMOL/L (ref 3.5–5)
POTASSIUM SERPL-SCNC: 6 MMOL/L (ref 3.5–5)
POTASSIUM SERPL-SCNC: 6.1 MMOL/L (ref 3.5–5)
PRO-BNP: ABNORMAL PG/ML (ref 0–450)
PROCALCITONIN: 0.18 NG/ML (ref 0–0.08)
PROTEIN UA: NEGATIVE MG/DL
PROTHROMBIN TIME: 12.6 SEC (ref 9.3–12.4)
PROTHROMBIN TIME: 13.3 SEC (ref 9.3–12.4)
RBC # BLD: 3.41 E12/L (ref 3.8–5.8)
RBC # BLD: 3.61 E12/L (ref 3.8–5.8)
RBC # BLD: 3.69 E12/L (ref 3.8–5.8)
RBC # BLD: 3.78 E12/L (ref 3.8–5.8)
RBC # BLD: 3.85 E12/L (ref 3.8–5.8)
RBC # BLD: 4 E12/L (ref 3.8–5.8)
RBC # BLD: 4.31 E12/L (ref 3.8–5.8)
RBC UA: NORMAL /HPF (ref 0–2)
RR MECHANICAL: 18 B/MIN
SARS-COV-2, NAAT: NOT DETECTED
SODIUM BLD-SCNC: 132 MMOL/L (ref 132–146)
SODIUM BLD-SCNC: 134 MMOL/L (ref 132–146)
SODIUM BLD-SCNC: 135 MMOL/L (ref 132–146)
SODIUM BLD-SCNC: 136 MMOL/L (ref 132–146)
SODIUM BLD-SCNC: 139 MMOL/L (ref 132–146)
SODIUM BLD-SCNC: 140 MMOL/L (ref 132–146)
SOURCE, BLOOD GAS: ABNORMAL
SPECIFIC GRAVITY UA: 1.02 (ref 1–1.03)
THB: 11.6 G/DL (ref 11.5–16.5)
THB: 11.9 G/DL (ref 11.5–16.5)
THB: 12.4 G/DL (ref 11.5–16.5)
THB: 12.5 G/DL (ref 11.5–16.5)
TIME ANALYZED: 159
TIME ANALYZED: 42
TIME ANALYZED: 46
TIME ANALYZED: 951
TOTAL PROTEIN: 5.9 G/DL (ref 6.4–8.3)
TOTAL PROTEIN: 6 G/DL (ref 6.4–8.3)
TOTAL PROTEIN: 6.3 G/DL (ref 6.4–8.3)
TOTAL PROTEIN: 6.3 G/DL (ref 6.4–8.3)
TROPONIN: 1.29 NG/ML (ref 0–0.03)
TROPONIN: 1.29 NG/ML (ref 0–0.03)
TROPONIN: 1.51 NG/ML (ref 0–0.03)
TROPONIN: 1.68 NG/ML (ref 0–0.03)
UROBILINOGEN, URINE: 0.2 E.U./DL
VT MECHANICAL: 550 ML
WBC # BLD: 10.1 E9/L (ref 4.5–11.5)
WBC # BLD: 10.9 E9/L (ref 4.5–11.5)
WBC # BLD: 15.5 E9/L (ref 4.5–11.5)
WBC # BLD: 6.5 E9/L (ref 4.5–11.5)
WBC # BLD: 7.7 E9/L (ref 4.5–11.5)
WBC # BLD: 8.1 E9/L (ref 4.5–11.5)
WBC # BLD: 9.8 E9/L (ref 4.5–11.5)
WBC UA: NORMAL /HPF (ref 0–5)

## 2020-01-01 PROCEDURE — 36415 COLL VENOUS BLD VENIPUNCTURE: CPT

## 2020-01-01 PROCEDURE — 93010 ELECTROCARDIOGRAM REPORT: CPT | Performed by: INTERNAL MEDICINE

## 2020-01-01 PROCEDURE — 99205 OFFICE O/P NEW HI 60 MIN: CPT

## 2020-01-01 PROCEDURE — 97535 SELF CARE MNGMENT TRAINING: CPT

## 2020-01-01 PROCEDURE — 77386 HC NTSTY MODUL RAD TX DLVR CPLX: CPT | Performed by: RADIOLOGY

## 2020-01-01 PROCEDURE — 6360000002 HC RX W HCPCS: Performed by: INTERNAL MEDICINE

## 2020-01-01 PROCEDURE — 85025 COMPLETE CBC W/AUTO DIFF WBC: CPT

## 2020-01-01 PROCEDURE — 82962 GLUCOSE BLOOD TEST: CPT

## 2020-01-01 PROCEDURE — 99999 PR OFFICE/OUTPT VISIT,PROCEDURE ONLY: CPT | Performed by: RADIOLOGY

## 2020-01-01 PROCEDURE — 85610 PROTHROMBIN TIME: CPT

## 2020-01-01 PROCEDURE — 99205 OFFICE O/P NEW HI 60 MIN: CPT | Performed by: RADIOLOGY

## 2020-01-01 PROCEDURE — 2580000003 HC RX 258: Performed by: INTERNAL MEDICINE

## 2020-01-01 PROCEDURE — 3700000000 HC ANESTHESIA ATTENDED CARE

## 2020-01-01 PROCEDURE — 93970 EXTREMITY STUDY: CPT

## 2020-01-01 PROCEDURE — 99223 1ST HOSP IP/OBS HIGH 75: CPT | Performed by: INTERNAL MEDICINE

## 2020-01-01 PROCEDURE — 80053 COMPREHEN METABOLIC PANEL: CPT

## 2020-01-01 PROCEDURE — U0002 COVID-19 LAB TEST NON-CDC: HCPCS

## 2020-01-01 PROCEDURE — 6370000000 HC RX 637 (ALT 250 FOR IP): Performed by: INTERNAL MEDICINE

## 2020-01-01 PROCEDURE — 2500000003 HC RX 250 WO HCPCS: Performed by: STUDENT IN AN ORGANIZED HEALTH CARE EDUCATION/TRAINING PROGRAM

## 2020-01-01 PROCEDURE — 94660 CPAP INITIATION&MGMT: CPT

## 2020-01-01 PROCEDURE — 6370000000 HC RX 637 (ALT 250 FOR IP): Performed by: FAMILY MEDICINE

## 2020-01-01 PROCEDURE — 93005 ELECTROCARDIOGRAM TRACING: CPT | Performed by: INTERNAL MEDICINE

## 2020-01-01 PROCEDURE — 2700000000 HC OXYGEN THERAPY PER DAY

## 2020-01-01 PROCEDURE — APPSS60 APP SPLIT SHARED TIME 46-60 MINUTES: Performed by: PHYSICIAN ASSISTANT

## 2020-01-01 PROCEDURE — 85027 COMPLETE CBC AUTOMATED: CPT

## 2020-01-01 PROCEDURE — 94640 AIRWAY INHALATION TREATMENT: CPT

## 2020-01-01 PROCEDURE — 77336 RADIATION PHYSICS CONSULT: CPT | Performed by: RADIOLOGY

## 2020-01-01 PROCEDURE — 84484 ASSAY OF TROPONIN QUANT: CPT

## 2020-01-01 PROCEDURE — 93005 ELECTROCARDIOGRAM TRACING: CPT | Performed by: EMERGENCY MEDICINE

## 2020-01-01 PROCEDURE — 97116 GAIT TRAINING THERAPY: CPT | Performed by: PHYSICAL THERAPIST

## 2020-01-01 PROCEDURE — 80076 HEPATIC FUNCTION PANEL: CPT

## 2020-01-01 PROCEDURE — 96366 THER/PROPH/DIAG IV INF ADDON: CPT

## 2020-01-01 PROCEDURE — 2060000000 HC ICU INTERMEDIATE R&B

## 2020-01-01 PROCEDURE — 99233 SBSQ HOSP IP/OBS HIGH 50: CPT | Performed by: INTERNAL MEDICINE

## 2020-01-01 PROCEDURE — 70470 CT HEAD/BRAIN W/O & W/DYE: CPT

## 2020-01-01 PROCEDURE — 88342 IMHCHEM/IMCYTCHM 1ST ANTB: CPT

## 2020-01-01 PROCEDURE — 32405 CT NEEDLE BIOPSY LUNG PERCUTANEOUS: CPT

## 2020-01-01 PROCEDURE — 97530 THERAPEUTIC ACTIVITIES: CPT | Performed by: PHYSICAL THERAPIST

## 2020-01-01 PROCEDURE — 6360000002 HC RX W HCPCS: Performed by: FAMILY MEDICINE

## 2020-01-01 PROCEDURE — 83880 ASSAY OF NATRIURETIC PEPTIDE: CPT

## 2020-01-01 PROCEDURE — 93005 ELECTROCARDIOGRAM TRACING: CPT | Performed by: PHYSICIAN ASSISTANT

## 2020-01-01 PROCEDURE — 71045 X-RAY EXAM CHEST 1 VIEW: CPT

## 2020-01-01 PROCEDURE — 3700000001 HC ADD 15 MINUTES (ANESTHESIA)

## 2020-01-01 PROCEDURE — 97162 PT EVAL MOD COMPLEX 30 MIN: CPT | Performed by: PHYSICAL THERAPIST

## 2020-01-01 PROCEDURE — 83605 ASSAY OF LACTIC ACID: CPT

## 2020-01-01 PROCEDURE — 2580000003 HC RX 258: Performed by: EMERGENCY MEDICINE

## 2020-01-01 PROCEDURE — 36600 WITHDRAWAL OF ARTERIAL BLOOD: CPT

## 2020-01-01 PROCEDURE — 81001 URINALYSIS AUTO W/SCOPE: CPT

## 2020-01-01 PROCEDURE — 97166 OT EVAL MOD COMPLEX 45 MIN: CPT

## 2020-01-01 PROCEDURE — 2500000003 HC RX 250 WO HCPCS: Performed by: RADIOLOGY

## 2020-01-01 PROCEDURE — 6370000000 HC RX 637 (ALT 250 FOR IP): Performed by: EMERGENCY MEDICINE

## 2020-01-01 PROCEDURE — 6360000002 HC RX W HCPCS: Performed by: EMERGENCY MEDICINE

## 2020-01-01 PROCEDURE — 7100000011 HC PHASE II RECOVERY - ADDTL 15 MIN

## 2020-01-01 PROCEDURE — 88305 TISSUE EXAM BY PATHOLOGIST: CPT

## 2020-01-01 PROCEDURE — 94664 DEMO&/EVAL PT USE INHALER: CPT

## 2020-01-01 PROCEDURE — 78815 PET IMAGE W/CT SKULL-THIGH: CPT

## 2020-01-01 PROCEDURE — 6360000002 HC RX W HCPCS

## 2020-01-01 PROCEDURE — 2500000003 HC RX 250 WO HCPCS: Performed by: INTERNAL MEDICINE

## 2020-01-01 PROCEDURE — 80048 BASIC METABOLIC PNL TOTAL CA: CPT

## 2020-01-01 PROCEDURE — 83690 ASSAY OF LIPASE: CPT

## 2020-01-01 PROCEDURE — 96375 TX/PRO/DX INJ NEW DRUG ADDON: CPT

## 2020-01-01 PROCEDURE — 2580000003 HC RX 258: Performed by: NURSE ANESTHETIST, CERTIFIED REGISTERED

## 2020-01-01 PROCEDURE — 77334 RADIATION TREATMENT AID(S): CPT | Performed by: RADIOLOGY

## 2020-01-01 PROCEDURE — 83735 ASSAY OF MAGNESIUM: CPT

## 2020-01-01 PROCEDURE — 82805 BLOOD GASES W/O2 SATURATION: CPT

## 2020-01-01 PROCEDURE — 84145 PROCALCITONIN (PCT): CPT

## 2020-01-01 PROCEDURE — 2580000003 HC RX 258: Performed by: RADIOLOGY

## 2020-01-01 PROCEDURE — 2709999900 CT GUIDED NEEDLE PLACEMENT

## 2020-01-01 PROCEDURE — 3430000000 HC RX DIAGNOSTIC RADIOPHARMACEUTICAL: Performed by: RADIOLOGY

## 2020-01-01 PROCEDURE — 93306 TTE W/DOPPLER COMPLETE: CPT

## 2020-01-01 PROCEDURE — 85730 THROMBOPLASTIN TIME PARTIAL: CPT

## 2020-01-01 PROCEDURE — A9552 F18 FDG: HCPCS | Performed by: RADIOLOGY

## 2020-01-01 PROCEDURE — 96365 THER/PROPH/DIAG IV INF INIT: CPT

## 2020-01-01 PROCEDURE — 77301 RADIOTHERAPY DOSE PLAN IMRT: CPT | Performed by: RADIOLOGY

## 2020-01-01 PROCEDURE — 77338 DESIGN MLC DEVICE FOR IMRT: CPT | Performed by: RADIOLOGY

## 2020-01-01 PROCEDURE — 2500000003 HC RX 250 WO HCPCS: Performed by: EMERGENCY MEDICINE

## 2020-01-01 PROCEDURE — 87040 BLOOD CULTURE FOR BACTERIA: CPT

## 2020-01-01 PROCEDURE — 77300 RADIATION THERAPY DOSE PLAN: CPT | Performed by: RADIOLOGY

## 2020-01-01 PROCEDURE — 93005 ELECTROCARDIOGRAM TRACING: CPT | Performed by: STUDENT IN AN ORGANIZED HEALTH CARE EDUCATION/TRAINING PROGRAM

## 2020-01-01 PROCEDURE — 6360000002 HC RX W HCPCS: Performed by: NURSE ANESTHETIST, CERTIFIED REGISTERED

## 2020-01-01 PROCEDURE — 6360000004 HC RX CONTRAST MEDICATION: Performed by: RADIOLOGY

## 2020-01-01 PROCEDURE — 88341 IMHCHEM/IMCYTCHM EA ADD ANTB: CPT

## 2020-01-01 PROCEDURE — 7100000010 HC PHASE II RECOVERY - FIRST 15 MIN

## 2020-01-01 PROCEDURE — 99285 EMERGENCY DEPT VISIT HI MDM: CPT

## 2020-01-01 PROCEDURE — 71250 CT THORAX DX C-: CPT

## 2020-01-01 PROCEDURE — 84132 ASSAY OF SERUM POTASSIUM: CPT

## 2020-01-01 PROCEDURE — 2580000003 HC RX 258

## 2020-01-01 PROCEDURE — 85378 FIBRIN DEGRADE SEMIQUANT: CPT

## 2020-01-01 RX ORDER — GABAPENTIN 300 MG/1
300 CAPSULE ORAL 3 TIMES DAILY
Status: DISCONTINUED | OUTPATIENT
Start: 2020-01-01 | End: 2020-01-01 | Stop reason: HOSPADM

## 2020-01-01 RX ORDER — SODIUM POLYSTYRENE SULFONATE 15 G/60ML
15 SUSPENSION ORAL; RECTAL ONCE
Status: COMPLETED | OUTPATIENT
Start: 2020-01-01 | End: 2020-01-01

## 2020-01-01 RX ORDER — MIDAZOLAM HYDROCHLORIDE 1 MG/ML
1 INJECTION INTRAMUSCULAR; INTRAVENOUS EVERY 8 HOURS PRN
Status: DISCONTINUED | OUTPATIENT
Start: 2020-01-01 | End: 2020-01-01 | Stop reason: HOSPADM

## 2020-01-01 RX ORDER — CLOPIDOGREL BISULFATE 75 MG/1
75 TABLET ORAL DAILY
Status: DISCONTINUED | OUTPATIENT
Start: 2020-01-01 | End: 2020-01-01 | Stop reason: HOSPADM

## 2020-01-01 RX ORDER — SODIUM CHLORIDE 9 MG/ML
INJECTION, SOLUTION INTRAVENOUS CONTINUOUS PRN
Status: DISCONTINUED | OUTPATIENT
Start: 2020-01-01 | End: 2020-01-01 | Stop reason: SDUPTHER

## 2020-01-01 RX ORDER — FUROSEMIDE 10 MG/ML
40 INJECTION INTRAMUSCULAR; INTRAVENOUS DAILY
Status: DISCONTINUED | OUTPATIENT
Start: 2020-01-01 | End: 2020-01-01 | Stop reason: HOSPADM

## 2020-01-01 RX ORDER — METHYLPREDNISOLONE SODIUM SUCCINATE 125 MG/2ML
INJECTION, POWDER, LYOPHILIZED, FOR SOLUTION INTRAMUSCULAR; INTRAVENOUS
Status: COMPLETED
Start: 2020-01-01 | End: 2020-01-01

## 2020-01-01 RX ORDER — FUROSEMIDE 10 MG/ML
40 INJECTION INTRAMUSCULAR; INTRAVENOUS ONCE
Status: COMPLETED | OUTPATIENT
Start: 2020-01-01 | End: 2020-01-01

## 2020-01-01 RX ORDER — PREDNISONE 10 MG/1
10 TABLET ORAL DAILY
Qty: 30 TABLET | Refills: 0 | Status: SHIPPED | OUTPATIENT
Start: 2020-01-01 | End: 2020-01-01

## 2020-01-01 RX ORDER — ACETAMINOPHEN 325 MG/1
650 TABLET ORAL EVERY 6 HOURS PRN
Status: DISCONTINUED | OUTPATIENT
Start: 2020-01-01 | End: 2020-01-01 | Stop reason: HOSPADM

## 2020-01-01 RX ORDER — MORPHINE SULFATE 2 MG/ML
2 INJECTION, SOLUTION INTRAMUSCULAR; INTRAVENOUS ONCE
Status: COMPLETED | OUTPATIENT
Start: 2020-01-01 | End: 2020-01-01

## 2020-01-01 RX ORDER — FENTANYL CITRATE 50 UG/ML
INJECTION, SOLUTION INTRAMUSCULAR; INTRAVENOUS PRN
Status: DISCONTINUED | OUTPATIENT
Start: 2020-01-01 | End: 2020-01-01 | Stop reason: SDUPTHER

## 2020-01-01 RX ORDER — DEXTROSE MONOHYDRATE 25 G/50ML
12.5 INJECTION, SOLUTION INTRAVENOUS PRN
Status: DISCONTINUED | OUTPATIENT
Start: 2020-01-01 | End: 2020-01-01 | Stop reason: HOSPADM

## 2020-01-01 RX ORDER — SODIUM CHLORIDE 0.9 % (FLUSH) 0.9 %
10 SYRINGE (ML) INJECTION EVERY 12 HOURS SCHEDULED
Status: DISCONTINUED | OUTPATIENT
Start: 2020-01-01 | End: 2020-01-01 | Stop reason: HOSPADM

## 2020-01-01 RX ORDER — BUDESONIDE AND FORMOTEROL FUMARATE DIHYDRATE 160; 4.5 UG/1; UG/1
1 AEROSOL RESPIRATORY (INHALATION) 2 TIMES DAILY
Status: DISCONTINUED | OUTPATIENT
Start: 2020-01-01 | End: 2020-01-01

## 2020-01-01 RX ORDER — ARFORMOTEROL TARTRATE 15 UG/2ML
15 SOLUTION RESPIRATORY (INHALATION) 2 TIMES DAILY
Status: DISCONTINUED | OUTPATIENT
Start: 2020-01-01 | End: 2020-01-01

## 2020-01-01 RX ORDER — ENALAPRIL MALEATE 2.5 MG/1
2.5 TABLET ORAL 2 TIMES DAILY
Status: DISCONTINUED | OUTPATIENT
Start: 2020-01-01 | End: 2020-01-01

## 2020-01-01 RX ORDER — METOPROLOL SUCCINATE 50 MG/1
50 TABLET, EXTENDED RELEASE ORAL 2 TIMES DAILY
Status: DISCONTINUED | OUTPATIENT
Start: 2020-01-01 | End: 2020-01-01 | Stop reason: HOSPADM

## 2020-01-01 RX ORDER — HEPARIN SODIUM 10000 [USP'U]/ML
5000 INJECTION, SOLUTION INTRAVENOUS; SUBCUTANEOUS 3 TIMES DAILY
Status: DISCONTINUED | OUTPATIENT
Start: 2020-01-01 | End: 2020-01-01

## 2020-01-01 RX ORDER — IPRATROPIUM BROMIDE 42 UG/1
2 SPRAY, METERED NASAL 2 TIMES DAILY
Status: DISCONTINUED | OUTPATIENT
Start: 2020-01-01 | End: 2020-01-01 | Stop reason: CLARIF

## 2020-01-01 RX ORDER — FENOFIBRATE 160 MG/1
160 TABLET ORAL DAILY
Status: DISCONTINUED | OUTPATIENT
Start: 2020-01-01 | End: 2020-01-01 | Stop reason: HOSPADM

## 2020-01-01 RX ORDER — MORPHINE SULFATE 2 MG/ML
2 INJECTION, SOLUTION INTRAMUSCULAR; INTRAVENOUS EVERY 4 HOURS PRN
Status: DISCONTINUED | OUTPATIENT
Start: 2020-01-01 | End: 2020-01-01 | Stop reason: HOSPADM

## 2020-01-01 RX ORDER — TAMSULOSIN HYDROCHLORIDE 0.4 MG/1
0.4 CAPSULE ORAL DAILY
Status: DISCONTINUED | OUTPATIENT
Start: 2020-01-01 | End: 2020-01-01 | Stop reason: HOSPADM

## 2020-01-01 RX ORDER — SODIUM CHLORIDE 0.9 % (FLUSH) 0.9 %
10 SYRINGE (ML) INJECTION PRN
Status: CANCELLED | OUTPATIENT
Start: 2020-01-01

## 2020-01-01 RX ORDER — BUDESONIDE AND FORMOTEROL FUMARATE DIHYDRATE 80; 4.5 UG/1; UG/1
2 AEROSOL RESPIRATORY (INHALATION) 2 TIMES DAILY
Status: DISCONTINUED | OUTPATIENT
Start: 2020-01-01 | End: 2020-01-01 | Stop reason: SDUPTHER

## 2020-01-01 RX ORDER — PROPOFOL 10 MG/ML
INJECTION, EMULSION INTRAVENOUS CONTINUOUS PRN
Status: DISCONTINUED | OUTPATIENT
Start: 2020-01-01 | End: 2020-01-01 | Stop reason: SDUPTHER

## 2020-01-01 RX ORDER — SODIUM CHLORIDE 0.9 % (FLUSH) 0.9 %
10 SYRINGE (ML) INJECTION PRN
Status: DISCONTINUED | OUTPATIENT
Start: 2020-01-01 | End: 2020-01-01 | Stop reason: HOSPADM

## 2020-01-01 RX ORDER — CALCIUM GLUCONATE 94 MG/ML
1 INJECTION, SOLUTION INTRAVENOUS ONCE
Status: COMPLETED | OUTPATIENT
Start: 2020-01-01 | End: 2020-01-01

## 2020-01-01 RX ORDER — IPRATROPIUM BROMIDE 42 UG/1
2 SPRAY, METERED NASAL 2 TIMES DAILY
COMMUNITY

## 2020-01-01 RX ORDER — BUDESONIDE AND FORMOTEROL FUMARATE DIHYDRATE 160; 4.5 UG/1; UG/1
2 AEROSOL RESPIRATORY (INHALATION) 2 TIMES DAILY
Status: DISCONTINUED | OUTPATIENT
Start: 2020-01-01 | End: 2020-01-01 | Stop reason: HOSPADM

## 2020-01-01 RX ORDER — ALBUTEROL SULFATE 2.5 MG/3ML
2.5 SOLUTION RESPIRATORY (INHALATION) EVERY 4 HOURS PRN
Status: DISCONTINUED | OUTPATIENT
Start: 2020-01-01 | End: 2020-01-01 | Stop reason: HOSPADM

## 2020-01-01 RX ORDER — ALLOPURINOL 100 MG/1
100 TABLET ORAL DAILY
Status: DISCONTINUED | OUTPATIENT
Start: 2020-01-01 | End: 2020-01-01 | Stop reason: HOSPADM

## 2020-01-01 RX ORDER — FUROSEMIDE 20 MG/1
20 TABLET ORAL DAILY
Qty: 60 TABLET | Refills: 3 | Status: SHIPPED | OUTPATIENT
Start: 2020-01-01

## 2020-01-01 RX ORDER — ALBUTEROL SULFATE 90 UG/1
2 AEROSOL, METERED RESPIRATORY (INHALATION) EVERY 6 HOURS PRN
COMMUNITY

## 2020-01-01 RX ORDER — LIDOCAINE HYDROCHLORIDE 20 MG/ML
INJECTION, SOLUTION INFILTRATION; PERINEURAL
Status: COMPLETED | OUTPATIENT
Start: 2020-01-01 | End: 2020-01-01

## 2020-01-01 RX ORDER — ACETAMINOPHEN 325 MG/1
650 TABLET ORAL EVERY 6 HOURS PRN
COMMUNITY

## 2020-01-01 RX ORDER — METHYLPREDNISOLONE SODIUM SUCCINATE 40 MG/ML
40 INJECTION, POWDER, LYOPHILIZED, FOR SOLUTION INTRAMUSCULAR; INTRAVENOUS EVERY 6 HOURS
Status: DISCONTINUED | OUTPATIENT
Start: 2020-01-01 | End: 2020-01-01

## 2020-01-01 RX ORDER — IPRATROPIUM BROMIDE AND ALBUTEROL SULFATE 2.5; .5 MG/3ML; MG/3ML
1 SOLUTION RESPIRATORY (INHALATION) EVERY 4 HOURS
Status: DISCONTINUED | OUTPATIENT
Start: 2020-01-01 | End: 2020-01-01

## 2020-01-01 RX ORDER — SODIUM CHLORIDE 450 MG/100ML
INJECTION, SOLUTION INTRAVENOUS CONTINUOUS
Status: DISCONTINUED | OUTPATIENT
Start: 2020-01-01 | End: 2020-01-01

## 2020-01-01 RX ORDER — METOPROLOL SUCCINATE 50 MG/1
50 TABLET, EXTENDED RELEASE ORAL 2 TIMES DAILY
Qty: 30 TABLET | Refills: 3 | Status: SHIPPED | OUTPATIENT
Start: 2020-01-01

## 2020-01-01 RX ORDER — DEXTROSE MONOHYDRATE 25 G/50ML
25 INJECTION, SOLUTION INTRAVENOUS ONCE
Status: COMPLETED | OUTPATIENT
Start: 2020-01-01 | End: 2020-01-01

## 2020-01-01 RX ORDER — IPRATROPIUM BROMIDE AND ALBUTEROL SULFATE 2.5; .5 MG/3ML; MG/3ML
1 SOLUTION RESPIRATORY (INHALATION) 4 TIMES DAILY
Status: DISCONTINUED | OUTPATIENT
Start: 2020-01-01 | End: 2020-01-01

## 2020-01-01 RX ORDER — MIDAZOLAM HYDROCHLORIDE 1 MG/ML
2 INJECTION INTRAMUSCULAR; INTRAVENOUS
Status: DISCONTINUED | OUTPATIENT
Start: 2020-01-01 | End: 2020-01-01

## 2020-01-01 RX ORDER — BUDESONIDE 0.25 MG/2ML
0.25 INHALANT ORAL 2 TIMES DAILY
Status: DISCONTINUED | OUTPATIENT
Start: 2020-01-01 | End: 2020-01-01

## 2020-01-01 RX ORDER — DEXTROSE MONOHYDRATE 100 MG/ML
INJECTION, SOLUTION INTRAVENOUS CONTINUOUS
Status: DISCONTINUED | OUTPATIENT
Start: 2020-01-01 | End: 2020-01-01

## 2020-01-01 RX ORDER — DILTIAZEM HYDROCHLORIDE 5 MG/ML
10 INJECTION INTRAVENOUS ONCE
Status: COMPLETED | OUTPATIENT
Start: 2020-01-01 | End: 2020-01-01

## 2020-01-01 RX ORDER — SODIUM CHLORIDE 0.9 % (FLUSH) 0.9 %
10 SYRINGE (ML) INJECTION
Status: COMPLETED | OUTPATIENT
Start: 2020-01-01 | End: 2020-01-01

## 2020-01-01 RX ORDER — GABAPENTIN 300 MG/1
300 CAPSULE ORAL 3 TIMES DAILY
COMMUNITY

## 2020-01-01 RX ORDER — IPRATROPIUM BROMIDE AND ALBUTEROL SULFATE 2.5; .5 MG/3ML; MG/3ML
1 SOLUTION RESPIRATORY (INHALATION) 4 TIMES DAILY
COMMUNITY

## 2020-01-01 RX ORDER — FINASTERIDE 5 MG/1
5 TABLET, FILM COATED ORAL DAILY
Status: DISCONTINUED | OUTPATIENT
Start: 2020-01-01 | End: 2020-01-01 | Stop reason: HOSPADM

## 2020-01-01 RX ORDER — NICOTINE POLACRILEX 4 MG
15 LOZENGE BUCCAL PRN
Status: DISCONTINUED | OUTPATIENT
Start: 2020-01-01 | End: 2020-01-01 | Stop reason: HOSPADM

## 2020-01-01 RX ORDER — FLUDEOXYGLUCOSE F 18 200 MCI/ML
14.6 INJECTION, SOLUTION INTRAVENOUS
Status: COMPLETED | OUTPATIENT
Start: 2020-01-01 | End: 2020-01-01

## 2020-01-01 RX ORDER — ACETAMINOPHEN 325 MG/1
650 TABLET ORAL EVERY 4 HOURS PRN
Status: DISCONTINUED | OUTPATIENT
Start: 2020-01-01 | End: 2020-01-01 | Stop reason: SDUPTHER

## 2020-01-01 RX ORDER — METOPROLOL SUCCINATE 25 MG/1
25 TABLET, EXTENDED RELEASE ORAL DAILY
Status: DISCONTINUED | OUTPATIENT
Start: 2020-01-01 | End: 2020-01-01

## 2020-01-01 RX ORDER — BUDESONIDE AND FORMOTEROL FUMARATE DIHYDRATE 80; 4.5 UG/1; UG/1
2 AEROSOL RESPIRATORY (INHALATION) 2 TIMES DAILY
COMMUNITY

## 2020-01-01 RX ORDER — PREDNISONE 10 MG/1
30 TABLET ORAL DAILY
Status: DISCONTINUED | OUTPATIENT
Start: 2020-01-01 | End: 2020-01-01 | Stop reason: HOSPADM

## 2020-01-01 RX ORDER — ALBUTEROL SULFATE 2.5 MG/3ML
10 SOLUTION RESPIRATORY (INHALATION) ONCE
Status: DISCONTINUED | OUTPATIENT
Start: 2020-01-01 | End: 2020-01-01

## 2020-01-01 RX ORDER — METHYLPREDNISOLONE SODIUM SUCCINATE 40 MG/ML
20 INJECTION, POWDER, LYOPHILIZED, FOR SOLUTION INTRAMUSCULAR; INTRAVENOUS EVERY 12 HOURS
Status: COMPLETED | OUTPATIENT
Start: 2020-01-01 | End: 2020-01-01

## 2020-01-01 RX ORDER — SODIUM CHLORIDE 0.9 % (FLUSH) 0.9 %
10 SYRINGE (ML) INJECTION PRN
Status: DISCONTINUED | OUTPATIENT
Start: 2020-01-01 | End: 2020-01-01 | Stop reason: SDUPTHER

## 2020-01-01 RX ORDER — MORPHINE SULFATE 2 MG/ML
INJECTION, SOLUTION INTRAMUSCULAR; INTRAVENOUS
Status: COMPLETED
Start: 2020-01-01 | End: 2020-01-01

## 2020-01-01 RX ORDER — METHYLPREDNISOLONE SODIUM SUCCINATE 125 MG/2ML
125 INJECTION, POWDER, LYOPHILIZED, FOR SOLUTION INTRAMUSCULAR; INTRAVENOUS DAILY
Status: DISCONTINUED | OUTPATIENT
Start: 2020-01-01 | End: 2020-01-01 | Stop reason: ALTCHOICE

## 2020-01-01 RX ORDER — TAMSULOSIN HYDROCHLORIDE 0.4 MG/1
0.4 CAPSULE ORAL DAILY
COMMUNITY

## 2020-01-01 RX ORDER — BUDESONIDE AND FORMOTEROL FUMARATE DIHYDRATE 160; 4.5 UG/1; UG/1
1 AEROSOL RESPIRATORY (INHALATION) 2 TIMES DAILY
Status: ON HOLD | COMMUNITY
End: 2020-01-01

## 2020-01-01 RX ORDER — VITS A,C,E/LUTEIN/MINERALS 300MCG-200
1 TABLET ORAL 2 TIMES DAILY
Status: DISCONTINUED | OUTPATIENT
Start: 2020-01-01 | End: 2020-01-01 | Stop reason: HOSPADM

## 2020-01-01 RX ORDER — METHYLPREDNISOLONE SODIUM SUCCINATE 40 MG/ML
40 INJECTION, POWDER, LYOPHILIZED, FOR SOLUTION INTRAMUSCULAR; INTRAVENOUS EVERY 8 HOURS
Status: DISCONTINUED | OUTPATIENT
Start: 2020-01-01 | End: 2020-01-01

## 2020-01-01 RX ORDER — DEXTROSE MONOHYDRATE 50 MG/ML
100 INJECTION, SOLUTION INTRAVENOUS PRN
Status: DISCONTINUED | OUTPATIENT
Start: 2020-01-01 | End: 2020-01-01 | Stop reason: HOSPADM

## 2020-01-01 RX ORDER — MAGNESIUM SULFATE 1 G/100ML
1 INJECTION INTRAVENOUS ONCE
Status: COMPLETED | OUTPATIENT
Start: 2020-01-01 | End: 2020-01-01

## 2020-01-01 RX ORDER — METOPROLOL TARTRATE 5 MG/5ML
5 INJECTION INTRAVENOUS ONCE
Status: COMPLETED | OUTPATIENT
Start: 2020-01-01 | End: 2020-01-01

## 2020-01-01 RX ADMIN — IOPAMIDOL 90 ML: 755 INJECTION, SOLUTION INTRAVENOUS at 09:01

## 2020-01-01 RX ADMIN — DEXTROSE MONOHYDRATE: 100 INJECTION, SOLUTION INTRAVENOUS at 15:02

## 2020-01-01 RX ADMIN — FENOFIBRATE 160 MG: 160 TABLET ORAL at 08:31

## 2020-01-01 RX ADMIN — BUDESONIDE AND FORMOTEROL FUMARATE DIHYDRATE 2 PUFF: 160; 4.5 AEROSOL RESPIRATORY (INHALATION) at 21:26

## 2020-01-01 RX ADMIN — GABAPENTIN 300 MG: 300 CAPSULE ORAL at 22:13

## 2020-01-01 RX ADMIN — DILTIAZEM HYDROCHLORIDE 30 MG: 30 TABLET, FILM COATED ORAL at 20:06

## 2020-01-01 RX ADMIN — METOPROLOL SUCCINATE 50 MG: 50 TABLET, EXTENDED RELEASE ORAL at 08:33

## 2020-01-01 RX ADMIN — DILTIAZEM HYDROCHLORIDE 30 MG: 30 TABLET, FILM COATED ORAL at 13:47

## 2020-01-01 RX ADMIN — HEPARIN SODIUM 5000 UNITS: 10000 INJECTION INTRAVENOUS; SUBCUTANEOUS at 09:15

## 2020-01-01 RX ADMIN — FINASTERIDE 5 MG: 5 TABLET, FILM COATED ORAL at 08:31

## 2020-01-01 RX ADMIN — GABAPENTIN 300 MG: 300 CAPSULE ORAL at 14:29

## 2020-01-01 RX ADMIN — Medication 15 G: at 23:45

## 2020-01-01 RX ADMIN — SODIUM CHLORIDE, PRESERVATIVE FREE 10 ML: 5 INJECTION INTRAVENOUS at 03:02

## 2020-01-01 RX ADMIN — CALCIUM GLUCONATE 1 G: 98 INJECTION, SOLUTION INTRAVENOUS at 20:18

## 2020-01-01 RX ADMIN — IPRATROPIUM BROMIDE 0.5 MG: 0.5 SOLUTION RESPIRATORY (INHALATION) at 13:45

## 2020-01-01 RX ADMIN — GABAPENTIN 300 MG: 300 CAPSULE ORAL at 20:20

## 2020-01-01 RX ADMIN — MAGNESIUM SULFATE 1 G: 1 INJECTION INTRAVENOUS at 11:55

## 2020-01-01 RX ADMIN — CLOPIDOGREL 75 MG: 75 TABLET, FILM COATED ORAL at 08:31

## 2020-01-01 RX ADMIN — DEXTROSE MONOHYDRATE: 100 INJECTION, SOLUTION INTRAVENOUS at 00:34

## 2020-01-01 RX ADMIN — SODIUM CHLORIDE, PRESERVATIVE FREE 10 ML: 5 INJECTION INTRAVENOUS at 00:17

## 2020-01-01 RX ADMIN — CYCLOPENTOLATE HYDROCHLORIDE 1 TABLET: 10 SOLUTION/ DROPS OPHTHALMIC at 08:31

## 2020-01-01 RX ADMIN — Medication 15 G: at 21:30

## 2020-01-01 RX ADMIN — SODIUM CHLORIDE, PRESERVATIVE FREE 10 ML: 5 INJECTION INTRAVENOUS at 08:29

## 2020-01-01 RX ADMIN — CYCLOPENTOLATE HYDROCHLORIDE 1 TABLET: 10 SOLUTION/ DROPS OPHTHALMIC at 09:08

## 2020-01-01 RX ADMIN — GABAPENTIN 300 MG: 300 CAPSULE ORAL at 09:08

## 2020-01-01 RX ADMIN — IPRATROPIUM BROMIDE AND ALBUTEROL SULFATE 1 AMPULE: 2.5; .5 SOLUTION RESPIRATORY (INHALATION) at 02:49

## 2020-01-01 RX ADMIN — TAMSULOSIN HYDROCHLORIDE 0.4 MG: 0.4 CAPSULE ORAL at 08:31

## 2020-01-01 RX ADMIN — MIDAZOLAM HYDROCHLORIDE 2 MG: 1 INJECTION, SOLUTION INTRAMUSCULAR; INTRAVENOUS at 04:13

## 2020-01-01 RX ADMIN — HEPARIN SODIUM 5000 UNITS: 10000 INJECTION INTRAVENOUS; SUBCUTANEOUS at 01:59

## 2020-01-01 RX ADMIN — SODIUM CHLORIDE, PRESERVATIVE FREE 10 ML: 5 INJECTION INTRAVENOUS at 22:33

## 2020-01-01 RX ADMIN — DILTIAZEM HYDROCHLORIDE 60 MG: 30 TABLET, FILM COATED ORAL at 06:30

## 2020-01-01 RX ADMIN — ALLOPURINOL 100 MG: 100 TABLET ORAL at 08:33

## 2020-01-01 RX ADMIN — APIXABAN 10 MG: 5 TABLET, FILM COATED ORAL at 08:28

## 2020-01-01 RX ADMIN — SODIUM CHLORIDE, PRESERVATIVE FREE 10 ML: 5 INJECTION INTRAVENOUS at 08:34

## 2020-01-01 RX ADMIN — FUROSEMIDE 40 MG: 10 INJECTION, SOLUTION INTRAMUSCULAR; INTRAVENOUS at 08:29

## 2020-01-01 RX ADMIN — SODIUM CHLORIDE, PRESERVATIVE FREE 10 ML: 5 INJECTION INTRAVENOUS at 08:31

## 2020-01-01 RX ADMIN — GABAPENTIN 300 MG: 300 CAPSULE ORAL at 16:17

## 2020-01-01 RX ADMIN — GABAPENTIN 300 MG: 300 CAPSULE ORAL at 08:10

## 2020-01-01 RX ADMIN — IPRATROPIUM BROMIDE 0.5 MG: 0.5 SOLUTION RESPIRATORY (INHALATION) at 17:32

## 2020-01-01 RX ADMIN — IPRATROPIUM BROMIDE 0.5 MG: 0.5 SOLUTION RESPIRATORY (INHALATION) at 21:05

## 2020-01-01 RX ADMIN — IPRATROPIUM BROMIDE AND ALBUTEROL SULFATE 1 AMPULE: 2.5; .5 SOLUTION RESPIRATORY (INHALATION) at 13:32

## 2020-01-01 RX ADMIN — METHYLPREDNISOLONE SODIUM SUCCINATE 20 MG: 40 INJECTION, POWDER, FOR SOLUTION INTRAMUSCULAR; INTRAVENOUS at 00:16

## 2020-01-01 RX ADMIN — HEPARIN SODIUM 5000 UNITS: 10000 INJECTION INTRAVENOUS; SUBCUTANEOUS at 08:10

## 2020-01-01 RX ADMIN — METHYLPREDNISOLONE SODIUM SUCCINATE 40 MG: 40 INJECTION, POWDER, FOR SOLUTION INTRAMUSCULAR; INTRAVENOUS at 09:15

## 2020-01-01 RX ADMIN — CYCLOPENTOLATE HYDROCHLORIDE 1 TABLET: 10 SOLUTION/ DROPS OPHTHALMIC at 17:30

## 2020-01-01 RX ADMIN — MORPHINE SULFATE 2 MG: 2 INJECTION, SOLUTION INTRAMUSCULAR; INTRAVENOUS at 00:45

## 2020-01-01 RX ADMIN — HEPARIN SODIUM 5000 UNITS: 10000 INJECTION INTRAVENOUS; SUBCUTANEOUS at 22:13

## 2020-01-01 RX ADMIN — METOPROLOL SUCCINATE 50 MG: 50 TABLET, EXTENDED RELEASE ORAL at 08:29

## 2020-01-01 RX ADMIN — MORPHINE SULFATE 2 MG: 2 INJECTION, SOLUTION INTRAMUSCULAR; INTRAVENOUS at 03:01

## 2020-01-01 RX ADMIN — IPRATROPIUM BROMIDE 0.5 MG: 0.5 SOLUTION RESPIRATORY (INHALATION) at 10:15

## 2020-01-01 RX ADMIN — FINASTERIDE 5 MG: 5 TABLET, FILM COATED ORAL at 08:33

## 2020-01-01 RX ADMIN — METOPROLOL SUCCINATE 50 MG: 50 TABLET, EXTENDED RELEASE ORAL at 20:53

## 2020-01-01 RX ADMIN — SODIUM CHLORIDE, PRESERVATIVE FREE 10 ML: 5 INJECTION INTRAVENOUS at 01:59

## 2020-01-01 RX ADMIN — IPRATROPIUM BROMIDE AND ALBUTEROL SULFATE 1 AMPULE: 2.5; .5 SOLUTION RESPIRATORY (INHALATION) at 01:43

## 2020-01-01 RX ADMIN — DEXTROSE MONOHYDRATE 25 G: 25 INJECTION, SOLUTION INTRAVENOUS at 20:17

## 2020-01-01 RX ADMIN — BUDESONIDE AND FORMOTEROL FUMARATE DIHYDRATE 2 PUFF: 160; 4.5 AEROSOL RESPIRATORY (INHALATION) at 22:22

## 2020-01-01 RX ADMIN — IPRATROPIUM BROMIDE AND ALBUTEROL SULFATE 1 AMPULE: 2.5; .5 SOLUTION RESPIRATORY (INHALATION) at 10:22

## 2020-01-01 RX ADMIN — IPRATROPIUM BROMIDE 0.5 MG: 0.5 SOLUTION RESPIRATORY (INHALATION) at 09:36

## 2020-01-01 RX ADMIN — METOPROLOL SUCCINATE 25 MG: 25 TABLET, FILM COATED, EXTENDED RELEASE ORAL at 08:10

## 2020-01-01 RX ADMIN — SODIUM CHLORIDE: 4.5 INJECTION, SOLUTION INTRAVENOUS at 14:51

## 2020-01-01 RX ADMIN — DILTIAZEM HYDROCHLORIDE 30 MG: 30 TABLET, FILM COATED ORAL at 01:10

## 2020-01-01 RX ADMIN — TAMSULOSIN HYDROCHLORIDE 0.4 MG: 0.4 CAPSULE ORAL at 08:28

## 2020-01-01 RX ADMIN — BUDESONIDE AND FORMOTEROL FUMARATE DIHYDRATE 2 PUFF: 160; 4.5 AEROSOL RESPIRATORY (INHALATION) at 08:31

## 2020-01-01 RX ADMIN — CYCLOPENTOLATE HYDROCHLORIDE 1 TABLET: 10 SOLUTION/ DROPS OPHTHALMIC at 18:09

## 2020-01-01 RX ADMIN — APIXABAN 10 MG: 5 TABLET, FILM COATED ORAL at 20:53

## 2020-01-01 RX ADMIN — Medication 15 G: at 21:28

## 2020-01-01 RX ADMIN — IPRATROPIUM BROMIDE 0.5 MG: 0.5 SOLUTION RESPIRATORY (INHALATION) at 11:40

## 2020-01-01 RX ADMIN — FENTANYL CITRATE 20 MCG: 50 INJECTION, SOLUTION INTRAMUSCULAR; INTRAVENOUS at 09:39

## 2020-01-01 RX ADMIN — METHYLPREDNISOLONE SODIUM SUCCINATE 20 MG: 40 INJECTION, POWDER, FOR SOLUTION INTRAMUSCULAR; INTRAVENOUS at 01:11

## 2020-01-01 RX ADMIN — GABAPENTIN 300 MG: 300 CAPSULE ORAL at 20:07

## 2020-01-01 RX ADMIN — DEXTROSE MONOHYDRATE: 100 INJECTION, SOLUTION INTRAVENOUS at 04:57

## 2020-01-01 RX ADMIN — IPRATROPIUM BROMIDE 0.5 MG: 0.5 SOLUTION RESPIRATORY (INHALATION) at 17:07

## 2020-01-01 RX ADMIN — IPRATROPIUM BROMIDE AND ALBUTEROL SULFATE 1 AMPULE: 2.5; .5 SOLUTION RESPIRATORY (INHALATION) at 05:57

## 2020-01-01 RX ADMIN — FINASTERIDE 5 MG: 5 TABLET, FILM COATED ORAL at 08:28

## 2020-01-01 RX ADMIN — TAMSULOSIN HYDROCHLORIDE 0.4 MG: 0.4 CAPSULE ORAL at 08:33

## 2020-01-01 RX ADMIN — GABAPENTIN 300 MG: 300 CAPSULE ORAL at 13:27

## 2020-01-01 RX ADMIN — FENOFIBRATE 160 MG: 160 TABLET ORAL at 09:08

## 2020-01-01 RX ADMIN — ARFORMOTEROL TARTRATE 15 MCG: 15 SOLUTION RESPIRATORY (INHALATION) at 20:41

## 2020-01-01 RX ADMIN — IPRATROPIUM BROMIDE 0.5 MG: 0.5 SOLUTION RESPIRATORY (INHALATION) at 20:37

## 2020-01-01 RX ADMIN — METHYLPREDNISOLONE SODIUM SUCCINATE 40 MG: 40 INJECTION, POWDER, FOR SOLUTION INTRAMUSCULAR; INTRAVENOUS at 01:19

## 2020-01-01 RX ADMIN — IPRATROPIUM BROMIDE 0.5 MG: 0.5 SOLUTION RESPIRATORY (INHALATION) at 13:27

## 2020-01-01 RX ADMIN — SODIUM CHLORIDE, PRESERVATIVE FREE 10 ML: 5 INJECTION INTRAVENOUS at 20:07

## 2020-01-01 RX ADMIN — APIXABAN 10 MG: 5 TABLET, FILM COATED ORAL at 20:20

## 2020-01-01 RX ADMIN — CLOPIDOGREL 75 MG: 75 TABLET, FILM COATED ORAL at 08:10

## 2020-01-01 RX ADMIN — PROPOFOL 75 MCG/KG/MIN: 10 INJECTION, EMULSION INTRAVENOUS at 09:39

## 2020-01-01 RX ADMIN — IPRATROPIUM BROMIDE AND ALBUTEROL SULFATE 1 AMPULE: 2.5; .5 SOLUTION RESPIRATORY (INHALATION) at 16:16

## 2020-01-01 RX ADMIN — IPRATROPIUM BROMIDE AND ALBUTEROL SULFATE 1 AMPULE: 2.5; .5 SOLUTION RESPIRATORY (INHALATION) at 09:10

## 2020-01-01 RX ADMIN — SODIUM POLYSTYRENE SULFONATE 15 G: 15 SUSPENSION ORAL; RECTAL at 09:49

## 2020-01-01 RX ADMIN — DILTIAZEM HYDROCHLORIDE 60 MG: 30 TABLET, FILM COATED ORAL at 12:13

## 2020-01-01 RX ADMIN — TAMSULOSIN HYDROCHLORIDE 0.4 MG: 0.4 CAPSULE ORAL at 11:23

## 2020-01-01 RX ADMIN — FUROSEMIDE 40 MG: 10 INJECTION, SOLUTION INTRAMUSCULAR; INTRAVENOUS at 12:07

## 2020-01-01 RX ADMIN — METOPROLOL TARTRATE 5 MG: 5 INJECTION INTRAVENOUS at 00:45

## 2020-01-01 RX ADMIN — ARFORMOTEROL TARTRATE 15 MCG: 15 SOLUTION RESPIRATORY (INHALATION) at 09:11

## 2020-01-01 RX ADMIN — CLOPIDOGREL 75 MG: 75 TABLET, FILM COATED ORAL at 08:28

## 2020-01-01 RX ADMIN — IPRATROPIUM BROMIDE 0.5 MG: 0.5 SOLUTION RESPIRATORY (INHALATION) at 10:39

## 2020-01-01 RX ADMIN — FINASTERIDE 5 MG: 5 TABLET, FILM COATED ORAL at 10:45

## 2020-01-01 RX ADMIN — SODIUM CHLORIDE, PRESERVATIVE FREE 10 ML: 5 INJECTION INTRAVENOUS at 20:20

## 2020-01-01 RX ADMIN — SODIUM CHLORIDE, PRESERVATIVE FREE 10 ML: 5 INJECTION INTRAVENOUS at 08:10

## 2020-01-01 RX ADMIN — SODIUM POLYSTYRENE SULFONATE 15 G: 15 SUSPENSION ORAL; RECTAL at 12:07

## 2020-01-01 RX ADMIN — PREDNISONE 30 MG: 10 TABLET ORAL at 08:33

## 2020-01-01 RX ADMIN — MIDAZOLAM HYDROCHLORIDE 1 MG: 1 INJECTION, SOLUTION INTRAMUSCULAR; INTRAVENOUS at 11:46

## 2020-01-01 RX ADMIN — DEXTROSE MONOHYDRATE: 100 INJECTION, SOLUTION INTRAVENOUS at 20:18

## 2020-01-01 RX ADMIN — ALLOPURINOL 100 MG: 100 TABLET ORAL at 08:09

## 2020-01-01 RX ADMIN — METHYLPREDNISOLONE SODIUM SUCCINATE 20 MG: 40 INJECTION, POWDER, FOR SOLUTION INTRAMUSCULAR; INTRAVENOUS at 12:13

## 2020-01-01 RX ADMIN — GLUCAGON HYDROCHLORIDE 3 MG: KIT at 04:50

## 2020-01-01 RX ADMIN — METHYLPREDNISOLONE SODIUM SUCCINATE 20 MG: 40 INJECTION, POWDER, FOR SOLUTION INTRAMUSCULAR; INTRAVENOUS at 13:27

## 2020-01-01 RX ADMIN — VANCOMYCIN HYDROCHLORIDE 1750 MG: 10 INJECTION, POWDER, LYOPHILIZED, FOR SOLUTION INTRAVENOUS at 22:56

## 2020-01-01 RX ADMIN — FUROSEMIDE 40 MG: 10 INJECTION, SOLUTION INTRAMUSCULAR; INTRAVENOUS at 13:48

## 2020-01-01 RX ADMIN — IPRATROPIUM BROMIDE 0.5 MG: 0.5 SOLUTION RESPIRATORY (INHALATION) at 19:52

## 2020-01-01 RX ADMIN — IPRATROPIUM BROMIDE AND ALBUTEROL SULFATE 1 AMPULE: 2.5; .5 SOLUTION RESPIRATORY (INHALATION) at 20:40

## 2020-01-01 RX ADMIN — PREDNISONE 30 MG: 10 TABLET ORAL at 08:28

## 2020-01-01 RX ADMIN — DILTIAZEM HYDROCHLORIDE 30 MG: 30 TABLET, FILM COATED ORAL at 05:58

## 2020-01-01 RX ADMIN — CEFEPIME HYDROCHLORIDE 2 G: 2 INJECTION, POWDER, FOR SOLUTION INTRAVENOUS at 22:33

## 2020-01-01 RX ADMIN — GABAPENTIN 300 MG: 300 CAPSULE ORAL at 08:33

## 2020-01-01 RX ADMIN — IPRATROPIUM BROMIDE AND ALBUTEROL SULFATE 1 AMPULE: 2.5; .5 SOLUTION RESPIRATORY (INHALATION) at 02:59

## 2020-01-01 RX ADMIN — CYCLOPENTOLATE HYDROCHLORIDE 1 TABLET: 10 SOLUTION/ DROPS OPHTHALMIC at 16:35

## 2020-01-01 RX ADMIN — CLOPIDOGREL 75 MG: 75 TABLET, FILM COATED ORAL at 09:08

## 2020-01-01 RX ADMIN — APIXABAN 10 MG: 5 TABLET, FILM COATED ORAL at 08:33

## 2020-01-01 RX ADMIN — CYCLOPENTOLATE HYDROCHLORIDE 1 TABLET: 10 SOLUTION/ DROPS OPHTHALMIC at 08:33

## 2020-01-01 RX ADMIN — FENOFIBRATE 160 MG: 160 TABLET ORAL at 08:10

## 2020-01-01 RX ADMIN — Medication 15 G: at 23:07

## 2020-01-01 RX ADMIN — HEPARIN SODIUM 5000 UNITS: 10000 INJECTION INTRAVENOUS; SUBCUTANEOUS at 13:54

## 2020-01-01 RX ADMIN — Medication 10 ML: at 09:01

## 2020-01-01 RX ADMIN — METHYLPREDNISOLONE SODIUM SUCCINATE 125 MG: 125 INJECTION, POWDER, LYOPHILIZED, FOR SOLUTION INTRAMUSCULAR; INTRAVENOUS at 01:14

## 2020-01-01 RX ADMIN — GABAPENTIN 300 MG: 300 CAPSULE ORAL at 13:55

## 2020-01-01 RX ADMIN — GABAPENTIN 300 MG: 300 CAPSULE ORAL at 08:31

## 2020-01-01 RX ADMIN — METHYLPREDNISOLONE SODIUM SUCCINATE 40 MG: 40 INJECTION, POWDER, FOR SOLUTION INTRAMUSCULAR; INTRAVENOUS at 01:59

## 2020-01-01 RX ADMIN — CYCLOPENTOLATE HYDROCHLORIDE 1 TABLET: 10 SOLUTION/ DROPS OPHTHALMIC at 08:10

## 2020-01-01 RX ADMIN — FENOFIBRATE 160 MG: 160 TABLET ORAL at 08:28

## 2020-01-01 RX ADMIN — FUROSEMIDE 40 MG: 10 INJECTION, SOLUTION INTRAMUSCULAR; INTRAVENOUS at 20:18

## 2020-01-01 RX ADMIN — GABAPENTIN 300 MG: 300 CAPSULE ORAL at 08:29

## 2020-01-01 RX ADMIN — Medication 15 G: at 00:08

## 2020-01-01 RX ADMIN — SODIUM CHLORIDE, PRESERVATIVE FREE 10 ML: 5 INJECTION INTRAVENOUS at 20:55

## 2020-01-01 RX ADMIN — HEPARIN SODIUM 5000 UNITS: 10000 INJECTION INTRAVENOUS; SUBCUTANEOUS at 13:27

## 2020-01-01 RX ADMIN — ALLOPURINOL 100 MG: 100 TABLET ORAL at 08:31

## 2020-01-01 RX ADMIN — BUDESONIDE 250 MCG: 0.25 SUSPENSION RESPIRATORY (INHALATION) at 20:41

## 2020-01-01 RX ADMIN — METHYLPREDNISOLONE SODIUM SUCCINATE 125 MG: 125 INJECTION, POWDER, FOR SOLUTION INTRAMUSCULAR; INTRAVENOUS at 01:14

## 2020-01-01 RX ADMIN — SODIUM CHLORIDE, PRESERVATIVE FREE 10 ML: 5 INJECTION INTRAVENOUS at 04:50

## 2020-01-01 RX ADMIN — CYCLOPENTOLATE HYDROCHLORIDE 1 TABLET: 10 SOLUTION/ DROPS OPHTHALMIC at 08:28

## 2020-01-01 RX ADMIN — CYCLOPENTOLATE HYDROCHLORIDE 1 TABLET: 10 SOLUTION/ DROPS OPHTHALMIC at 20:54

## 2020-01-01 RX ADMIN — DILTIAZEM HYDROCHLORIDE 60 MG: 30 TABLET, FILM COATED ORAL at 18:09

## 2020-01-01 RX ADMIN — FINASTERIDE 5 MG: 5 TABLET, FILM COATED ORAL at 08:10

## 2020-01-01 RX ADMIN — ENOXAPARIN SODIUM 40 MG: 40 INJECTION SUBCUTANEOUS at 20:07

## 2020-01-01 RX ADMIN — FUROSEMIDE 40 MG: 10 INJECTION, SOLUTION INTRAMUSCULAR; INTRAVENOUS at 08:31

## 2020-01-01 RX ADMIN — DILTIAZEM HYDROCHLORIDE 60 MG: 30 TABLET, FILM COATED ORAL at 00:10

## 2020-01-01 RX ADMIN — IPRATROPIUM BROMIDE AND ALBUTEROL SULFATE 1 AMPULE: 2.5; .5 SOLUTION RESPIRATORY (INHALATION) at 02:58

## 2020-01-01 RX ADMIN — LIDOCAINE HYDROCHLORIDE 17 ML: 20 INJECTION, SOLUTION INFILTRATION; PERINEURAL at 11:01

## 2020-01-01 RX ADMIN — DILTIAZEM HYDROCHLORIDE 10 MG: 5 INJECTION INTRAVENOUS at 12:07

## 2020-01-01 RX ADMIN — GABAPENTIN 300 MG: 300 CAPSULE ORAL at 20:53

## 2020-01-01 RX ADMIN — CLOPIDOGREL 75 MG: 75 TABLET, FILM COATED ORAL at 08:33

## 2020-01-01 RX ADMIN — FUROSEMIDE 40 MG: 10 INJECTION, SOLUTION INTRAMUSCULAR; INTRAVENOUS at 08:33

## 2020-01-01 RX ADMIN — FLUDEOXYGLUCOSE F 18 14.6 MILLICURIE: 200 INJECTION, SOLUTION INTRAVENOUS at 08:08

## 2020-01-01 RX ADMIN — MIDAZOLAM HYDROCHLORIDE 2 MG: 1 INJECTION, SOLUTION INTRAMUSCULAR; INTRAVENOUS at 01:13

## 2020-01-01 RX ADMIN — METOPROLOL SUCCINATE 25 MG: 25 TABLET, FILM COATED, EXTENDED RELEASE ORAL at 08:31

## 2020-01-01 RX ADMIN — METHYLPREDNISOLONE SODIUM SUCCINATE 40 MG: 40 INJECTION, POWDER, FOR SOLUTION INTRAMUSCULAR; INTRAVENOUS at 16:35

## 2020-01-01 RX ADMIN — ENOXAPARIN SODIUM 40 MG: 40 INJECTION SUBCUTANEOUS at 08:31

## 2020-01-01 RX ADMIN — FENOFIBRATE 160 MG: 160 TABLET ORAL at 08:33

## 2020-01-01 RX ADMIN — ALLOPURINOL 100 MG: 100 TABLET ORAL at 09:08

## 2020-01-01 RX ADMIN — SODIUM BICARBONATE 50 MEQ: 84 INJECTION INTRAVENOUS at 20:18

## 2020-01-01 RX ADMIN — BUDESONIDE AND FORMOTEROL FUMARATE DIHYDRATE 2 PUFF: 160; 4.5 AEROSOL RESPIRATORY (INHALATION) at 20:55

## 2020-01-01 RX ADMIN — TAMSULOSIN HYDROCHLORIDE 0.4 MG: 0.4 CAPSULE ORAL at 08:09

## 2020-01-01 RX ADMIN — ACETAMINOPHEN 650 MG: 325 TABLET, FILM COATED ORAL at 11:46

## 2020-01-01 RX ADMIN — SODIUM CHLORIDE: 9 INJECTION, SOLUTION INTRAVENOUS at 09:07

## 2020-01-01 RX ADMIN — BUDESONIDE AND FORMOTEROL FUMARATE DIHYDRATE 2 PUFF: 160; 4.5 AEROSOL RESPIRATORY (INHALATION) at 08:33

## 2020-01-01 RX ADMIN — ALLOPURINOL 100 MG: 100 TABLET ORAL at 08:28

## 2020-01-01 RX ADMIN — ALBUTEROL SULFATE 2.5 MG: 2.5 SOLUTION RESPIRATORY (INHALATION) at 13:45

## 2020-01-01 RX ADMIN — AMIODARONE HYDROCHLORIDE 150 MG: 50 INJECTION, SOLUTION INTRAVENOUS at 13:16

## 2020-01-01 ASSESSMENT — PAIN SCALES - GENERAL
PAINLEVEL_OUTOF10: 2
PAINLEVEL_OUTOF10: 0
PAINLEVEL_OUTOF10: 5
PAINLEVEL_OUTOF10: 0
PAINLEVEL_OUTOF10: 5
PAINLEVEL_OUTOF10: 0
PAINLEVEL_OUTOF10: 0

## 2020-01-01 ASSESSMENT — ENCOUNTER SYMPTOMS
SHORTNESS OF BREATH: 1
SHORTNESS OF BREATH: 1
COUGH: 1
COLOR CHANGE: 0
SHORTNESS OF BREATH: 0
ABDOMINAL PAIN: 0
NAUSEA: 0
SHORTNESS OF BREATH: 1
DYSPNEA ACTIVITY LEVEL: AFTER AMBULATING 1 FLIGHT OF STAIRS
SHORTNESS OF BREATH: 1
VOMITING: 0
SHORTNESS OF BREATH: 1
SORE THROAT: 0

## 2020-01-01 ASSESSMENT — PAIN - FUNCTIONAL ASSESSMENT
PAIN_FUNCTIONAL_ASSESSMENT: 0-10

## 2020-01-01 ASSESSMENT — PAIN DESCRIPTION - DESCRIPTORS: DESCRIPTORS: SORE

## 2020-01-01 ASSESSMENT — PAIN DESCRIPTION - ORIENTATION: ORIENTATION: RIGHT

## 2020-01-01 ASSESSMENT — PAIN DESCRIPTION - LOCATION: LOCATION: SHOULDER

## 2020-01-01 ASSESSMENT — PAIN DESCRIPTION - FREQUENCY: FREQUENCY: INTERMITTENT

## 2020-01-01 ASSESSMENT — PAIN DESCRIPTION - PAIN TYPE: TYPE: CHRONIC PAIN

## 2020-02-18 PROBLEM — C34.11 MALIGNANT NEOPLASM OF UPPER LOBE OF RIGHT LUNG (HCC): Status: ACTIVE | Noted: 2020-01-01

## 2020-02-18 NOTE — PROGRESS NOTES
Debi Fangals.  1934 80 y.o. Referring Physician: Margareth Singletary    PCP: Javier Hayes MD     Vitals:    02/18/20 0807   BP: 124/60   Pulse: 73   Resp: 20   Temp: 98.2 °F (36.8 °C)   SpO2: 96%        Wt Readings from Last 3 Encounters:   02/18/20 211 lb 2 oz (95.8 kg)        Body mass index is 28.63 kg/m². Chief Complaint: No chief complaint on file. Cancer Staging  No matching staging information was found for the patient. Prior Radiation Therapy? NO    Concurrent Chemo/radiation? NO    Prior Chemotherapy? NO    Prior Hormonal Therapy? NO    Head and Neck Cancer? No, patient does NOT have HN cancer. Current Outpatient Medications   Medication Sig Dispense Refill    albuterol sulfate  (90 Base) MCG/ACT inhaler Inhale 2 puffs into the lungs every 6 hours as needed for Wheezing      budesonide-formoterol (SYMBICORT) 160-4.5 MCG/ACT AERO Inhale 2 puffs into the lungs 2 times daily      gabapentin (NEURONTIN) 300 MG capsule Take 300 mg by mouth 3 times daily. Indications: 1 tab in AM , 2 tabs at night      atorvastatin (LIPITOR) 80 MG tablet Take 80 mg by mouth daily.  metformin (GLUMETZA) 1000 MG (MOD) ER tablet Take 1,000 mg by mouth 2 times daily (with meals).  metoprolol (TOPROL-XL) 25 MG XL tablet Take 25 mg by mouth daily.  clopidogrel (PLAVIX) 75 MG tablet Take 75 mg by mouth daily.  enalapril (VASOTEC) 2.5 MG tablet Take 2.5 mg by mouth daily.  allopurinol (ZYLOPRIM) 100 MG tablet Take 100 mg by mouth daily.  finasteride (PROSCAR) 5 MG tablet Take 5 mg by mouth daily.  glimepiride (AMARYL) 2 MG tablet Take 1 mg by mouth every morning (before breakfast).  choline fenofibrate (TRILIPIX) 135 MG CPDR Take 135 mg by mouth daily. No current facility-administered medications for this encounter.         Past Medical History:   Diagnosis Date    Cancer [facility])     COPD (chronic obstructive pulmonary change in patient condition which places them at a risk for a fall   4-6 Moderate Risk 1. Provide assistance as indicated for ambulation activities  2. Reorient confused/cognitively impaired patient  3. Call-light/bell within patient's reach  4. Chair/bed in low position, stretcher/bed with siderails up except when performing patient care activities  5. Educate patient/family/caregiver on falls prevention  6. Falls risk precaution (Yellow sticker Level II) placed on patient chart   7 or   Higher High Risk 1. Place patient in easily observable treatment room  2. Patient attended at all times by family member or staff  3. Provide assistance as indicated for ambulation activities  4. Reorient confused/cognitively impaired patient  5. Call-light/bell within patient's reach  6. Chair/bed in low position, stretcher/bed with siderails up except when performing patient care activities  7. Educate patient/family/caregiver on falls prevention  8. Falls risk precaution (Yellow sticker Level III) placed on patient chart           MALNUTRITION RISK SCREENING ASSESSMENT    Instructions:  Assess the patient and enter the appropriate indicators that are present for nutrition risk identification. Total the numbers entered and assign a risk score. Follow the appropriate action for total score listed below. Assessment   Date  2/18/2020     1. Have you lost weight without trying? 0- No     2. Have you been eating poorly because of a decreased appetite? 0- No   3. Do you have a diagnosis of head and neck cancer?       0- No                                                                                    TOTAL 0          Score of 0-1: No action  Score 2 or greater:  · For Non-Diabetic Patient: Recommend adding Ensure Complete 2 x daily and provide patient with Ensure wellness bag with coupons  · For Diabetic Patient: Recommend adding Glucerna Shake 2 x daily and provide patient with Glucerna Wellness bag with coupons  · Route to the dietitian via [address]    · Are you having  difficulty performing daily routine tasks  due to fatigue or weakness (ie: bathing/showering, dressing, housework, meal prep, work, child Fracisco Isaac): No     · Do you have any arm flexibility/ROM restrictions, swelling or pain that limit activity: No     · Any changes in memory, attention/focus that impact daily activities: No     · Do you avoid participation in leisure/social activity due weakness, fatigue or pain: No     ARE ANY OF THE ABOVE ARE ANSWERED YES: No          PT ASSESSMENT FOR REFERRAL    · Have you had any recent falls in past 2 months: No     · Do you have difficulty  going up/down stairs: No     · Are you having difficulty walking: No     · Do you often hold onto furniture/environmental supports or feel off balance when you are walking: No     · Do you need to take rest breaks when you are walking: Yes / states trouble with hips    · Any pain on scale of 1-10 that limits your mobility: No 0/10    ARE ANY OF THE ABOVE ARE ANSWERED YES: Yes - but NO PT referral request sent due to patient refusal.                 Sandra Hernandez    - Is patient planned to receive Cisplatin? No. This patient is not planned to start Cisplatin. - Is patient planned to receive radiation therapy that may be directed toward auditory canals or nerves? No. Patient is not planned to start radiation therapy to auditory canals or nerves. - Is patient complaining of new onset hearing loss? No. Patient is not complaining of new onset hearing loss. Patient education given on Radiation Therapy, side effects and fall prevention utilizing handouts and slides. The patient expresses understanding and acceptance of instructions.  Shantanu Nicholas 2/18/2020 8:17 AM           Lina Obrien WDL

## 2020-02-18 NOTE — PROGRESS NOTES
Radiation Oncology      Shantelled Timothy. Alex Luke  [address]. [place]   [phone]               Referring Physician: / Destiny Shepherd MD   Primary Oncologist: pending      Diagnosis: right lung cancer      Service:  Radiation Oncology consultation performed on 2/18/20        HPI:      Al is lai pleasant 80year old, functionally intact with progressive SOB and back pain found to have a large RUL mass contiguous with the spinal column. Biopsy and PET pending. The patient presents today to discuss fractionated external beam radiation therapy as a component of multidisciplinary, definitive management. We reviewed the available medical records including the complete medical history of this pt today prior to consultation. Epic -CE and available scanned documents per the Epic Media tab were reviewed PRN. A complete ROS was also performed today and is noted below. During consultation today I personally discussed the pts workup to date; including but not limited to applicable imaging studies, Pathology reports, and interventions. The NCCN guidelines, as pertaining to the above diagnosis were also recapped for the pt today in brief. Today, [facility]  notes Sx that include pain, SOB. KPS 70.      -----  Pathology reviewed:    -pending      -----    Past Medical History:   Diagnosis Date    [facility]     [facility]ronic obstructive [facility] disease) [facility]     Diabetes mellitus [facility]     Gout     [facility]     Hyperlipemia     Hypertension     Prostate enlargement     Unspecified cerebral artery occlusion with cerebral infarction        History reviewed. No pertinent surgical history.     Family History   Problem Relation Age of Onset   Dennis Yosvany Cancer Mother         unknown type    Cancer Father         lymphpoma       Current Outpatient Medications   Medication Sig Judgment: Judgment normal.           Imaging reviewed:    [facility] CT reviewed 1/29/20-   cT3-CT4, cN1-N2 per our review      Radiation Safety and Treatment Support:  -previous Radiation history: No  -history of connective tissue disease: No  -history of autoimmune disease: No  -pregnant: not applicable  -fertility conservation and /or contraception discussed: not applicable  -nutrition consult prior to [address]: Yes  -PEG: No  -Dental evaluation prior to treatment:No  -Social Work requested: Yes  -Oncology Nurse Navigator requested: Yes  -pre + post treatment PT / Rehab / PM+R evaluation considered: Yes  -ICD: No   -ICD brand: -  -[facility] patient navigator: Zion Schwab  -Nurse Practitioners for Radiation Oncology:    ---Shlomo Flores, MSN, RN, FNP-C   ---Maxwell Martinez, HIEN, RN, FNP-BC        Assessment and Plan: Al is a pleasant and cooperative 80year old with a recent diagnosis of 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 We recommend a concurrent approach to definitive management of this locally advanced non small cell lung cancer - once biopsy proven  [chemo dosing per [address] record- see EMR - IF recommended]. Based on the clinical characteristic, this disease and stage is generally considered unresectable; optimal therapy tends to be a concurrent chemo-RT approach. Concomitant chemo-RT compared with sequential chemo-RT improved overall survival, primarily through better locoregional control, at the cost of manageable increases in acute esophageal toxicity as based on the Auperin metanalysis. These data demonstrate a benefit of concomitant chemo-RT over sequential chemo->RT on OS (HR 0.84, SS), with absolute benefit at 3-years of 5.7% (18% to 24%), 5-years 4.5% (11% to 15%). Interestingly, there was no difference in PFS (HR 0.9, p=0.07). However a decrease in locoregional progression (HR 0.777, SS), with absolute decrease of 6% at 5 years (35% to 29%) was shown.   There was no difference on distant progression (HR 1.04, NS), with 5-year rate of about 40% Diaz Flavin Oncol 2010 May 1;28(13):5287-1169). Regarding the radiation dose, 60 Gy in 2Gy/fx was established long ago in RTOG 73-01, with several other trials showing a feasibility of much higher doses however with RT alone. Interestingly in the concurrent era, RTOG 0617 tested higher dose arms (Concurrent RT + Carbo/Taxol +/- Cetuximab) these were closed early with \"negative\" results (longer term results and POFA needed), therefor 60 Gy in daily fractions with dual agent chemotherapy can be considered the standard (the Satinder and LAMP trials also assisted in the development of this paradigm). Fractionated external beam radiation therapy may or may not be delivered with intensity modulation +/- image guidance per daily cone beam depending on the specific patient anatomy and dose constraints as described per the RTOG and QUANTEC ---Jeramie Ellis, Int J Radiat Oncol Biol Phys. 2010 Mar 1;76(3 Suppl):S10-9. The risks, benefits, alternatives, process and logistics of external beam radiation were reviewed (risks include but are not limited to worsening PULM function, fibrosis, esophagitis, esophageal structure, perforations, bleeding, paralysis and death). We answered all of the patient's questions to the best of our ability. Al verbalized understanding and seemed satisfied. Radiation planning will commence within 7-10 days; the next step in management being the simulation scan, with external beam radiation to commence in a timely fashion thereafter. It was a pleasure meeting Al today and we appreciate the referral and opportunity to be involved in his care. We had an extensive discussion today regarding the course to date (including a focused review of theapplicable radiographic and laboratory information), multidisciplinary approach to cancer care, and indications for external beam radiation therapy as a component therein.  A literature review and

## 2020-02-18 NOTE — TELEPHONE ENCOUNTER
Met with patient and family during his initial consultation with Dr. Nikole Johnson  for his recent abnormal CT Chest scan for potential Lung Cancer. Introduced myself and explained my role with patients receiving treatment at our center. Patient was friendly and receptive. Patient was referred for biopsy. He has been contacted by IR with Dr April Varela for CT guided biopsy but has not been scheduled yet. He was referred to Medical Oncology also and prefers Dr Tommy Briceño but has not been contacted for any appointments with her yet. Patient brought in disk from CT Chest scan done on 1/29/20. Instructed on next steps including biopsy, Med onc referral and potential radiation treatments per Dr. Gurpreet Rosario recommendations and follow up care. Provided with  literature ACS Radiation Therapy. Provided with my contact information and instructed patient to call me with questions or concerns. Verbalizes understanding. Patient appreciative of visit. Will continue to follow.

## 2020-02-20 NOTE — PROGRESS NOTES
RN arranged for PET scan at [facility] thru [facility] 02/19/2020 per Dr. Calin Monte request, patient and family prefer to have PET scan here at [address] [place] [address] Krishna.

## 2020-03-03 NOTE — SEDATION DOCUMENTATION
Dr Harpreet Miranda telephonically informed that the patient was unable to tolerate the procedure. No orders received. Transported to pre procedure area and verbal report given to , \"Tami\"BENSON.

## 2020-03-03 NOTE — PROGRESS NOTES
Interventional Radiology    Per RN Alana Solorio, the patient was anxious and could not tolerate lying prone for the procedure. The procedure will need to be done with the help of Anesthesiology for adequate sedation.     Electronically signed by Alyson Rodriguez MD on 3/3/2020 at 10:59 AM

## 2020-03-03 NOTE — SEDATION DOCUMENTATION
Moved the patient prone to the procedure table with 3 person moderate assistance. He is immediately complaining of profound anxiety and SOB although his SPO2 is 96% on O2 at 4 L/NC. Dr Gilson Leal telephonically notified of patient's profound anxiety and the plan is to give 0.5mg Versed for anxiety.

## 2020-03-03 NOTE — TELEPHONE ENCOUNTER
Patient's daughter, Aguilar Bergman, will call [facility] for consult with Medical Oncology.

## 2020-03-03 NOTE — PROGRESS NOTES
Interventional Radiology Preprocedure Note    HPI:  Pedro Luis Aguilar is a 80 y.o. male with right  who was referred to Interventional Radiology for biopsy of a right lung mass. Allergies:  No Known Allergies    Vital Signs:  Vitals:    03/03/20 0842 03/03/20 1044   BP: (!) 169/82    Pulse: 101 134   Resp: 18 28   Temp: 98 °F (36.7 °C)    TempSrc: Temporal    SpO2: 96% 94%   Weight: 211 lb (95.7 kg)    Height: 6' (1.829 m)      Exam:  Gen: No apparent distress. Neuro: Awake, alert. Heart: Tachy, regular rhythm. Lungs: Clear to auscultation anteriorly. Lab Results:    Lab Results   Component Value Date    INR 1.2 03/03/2020    PROTIME 13.3 (H) 03/03/2020    WBC 8.1 03/03/2020    HGB 11.5 (L) 03/03/2020    HCT 39.0 03/03/2020     03/03/2020     03/03/2020    K 5.4 (H) 03/03/2020     03/03/2020    CREATININE 1.3 (H) 03/03/2020    BUN 23 03/03/2020    CO2 23 03/03/2020    PSA SEE NOTE (AA) 02/19/2019    PSA 1.61 02/19/2019       Imaging:  I have personally reviewed the patient's pertinent imaging. PET-CT shows FDG-avid mass in the apex of the right lung. Assessment and Plan:  Biopsy of right lung mass. Informed Consent:  Informed consent was obtained. The procedure and its risks, benefits, and alternatives were explained. These risks include, but are not limited to, pneumothorax, bleeding, and infection. The patient was given the opportunity to ask questions, indicated understanding, and gave permission to proceed.     Electronically signed by Lamonte Doyle MD on 3/3/2020 at 10:48 AM

## 2020-03-03 NOTE — TELEPHONE ENCOUNTER
RN placed call to [facility] regarding referral placed by Dr OSULLIVAN [facility] has been unable to reach patient to schedule consult as patient's phone rings busy-RN will reach out to patient to suggest he call for appointment with [facility].

## 2020-03-03 NOTE — PROGRESS NOTES
7825 Patient arrived via ambulatory with family to Radiology department for Image guided right lung biopsy. Allergies, home medications, H&P and fasting instructions reviewed with patient. Vital signs taken. IV placed, blood obtained, IV flushed and prn adapter attached. 9500 Blood sample sent to lab for ordered tests. 7015 Procedural instructions given, questions answered, understanding expressed and consent signed. Patient given fluoroscopy education, no questions at this time. 4414 To procedure via cart with RN.  7196 Returned via cart. Procedure not performed d/t pt. anxiety. Vss.Slightly anxious at this time. 1105 Vss. Resting calmly,taking po well. [address] for discharge. 1135 Discharge instructions reviewed with patient and understanding expressed. Signed copy given to patient and copy placed in folder for Medical Records. Family notified that procedure will be rescheduled. Discharged via ambulatory with family.

## 2020-03-11 NOTE — ANESTHESIA PRE PROCEDURE
1 mg by mouth every morning (before breakfast). Yes Historical Provider, MD   choline fenofibrate (TRILIPIX) 135 MG CPDR Take 135 mg by mouth daily. Yes Historical Provider, MD       Current medications:    Current Outpatient Medications   Medication Sig Dispense Refill    ipratropium (ATROVENT) 0.06 % nasal spray 2 sprays by Each Nostril route 2 times daily      albuterol sulfate  (90 Base) MCG/ACT inhaler Inhale 2 puffs into the lungs every 6 hours as needed for Wheezing      budesonide-formoterol (SYMBICORT) 160-4.5 MCG/ACT AERO Inhale 2 puffs into the lungs 2 times daily      gabapentin (NEURONTIN) 300 MG capsule Take 300 mg by mouth 3 times daily. Indications: 1 tab in AM , 2 tabs at night      acetaminophen (TYLENOL) 325 MG tablet Take 650 mg by mouth every 6 hours as needed for Pain      Multiple Vitamins-Minerals (PRESERVISION AREDS PO) Take by mouth 2 times daily       atorvastatin (LIPITOR) 80 MG tablet Take 80 mg by mouth daily.  metformin (GLUMETZA) 1000 MG (MOD) ER tablet Take 1,000 mg by mouth 2 times daily (with meals).  metoprolol (TOPROL-XL) 25 MG XL tablet Take 25 mg by mouth daily.  clopidogrel (PLAVIX) 75 MG tablet Take 75 mg by mouth daily.  enalapril (VASOTEC) 2.5 MG tablet Take 2.5 mg by mouth daily.  allopurinol (ZYLOPRIM) 100 MG tablet Take 100 mg by mouth daily.  finasteride (PROSCAR) 5 MG tablet Take 5 mg by mouth daily.  glimepiride (AMARYL) 2 MG tablet Take 1 mg by mouth every morning (before breakfast).  choline fenofibrate (TRILIPIX) 135 MG CPDR Take 135 mg by mouth daily.          Current Facility-Administered Medications   Medication Dose Route Frequency Provider Last Rate Last Dose    sodium chloride flush 0.9 % injection 10 mL  10 mL Intravenous PRN Alyson Rodriguez MD         Facility-Administered Medications Ordered in Other Encounters   Medication Dose Route Frequency Provider Last Rate Last Dose    sodium chloride flush 0.9 % injection 10 mL  10 mL Intravenous PRN Dianne Noble MD           Allergies:  No Known Allergies    Problem List:    Patient Active Problem List   Diagnosis Code    Malignant neoplasm of upper lobe of right lung (HCC) C34.11       Past Medical History:        Diagnosis Date    [facility]     [facility]     [facility]     Gout     [facility]     Hyperlipemia     Hypertension     Prostate enlargement     Unspecified cerebral artery occlusion with cerebral infarction        Past Surgical History:  History reviewed. No pertinent surgical history. Social History:    Social History     Tobacco Use    Smoking status: Former Smoker     Last attempt to quit:      Years since quittin.2    Smokeless tobacco: Never Used    Tobacco comment: quit - remote   Substance Use Topics    Alcohol use: No                                Counseling given: Not Answered  Comment: quit - remote      Vital Signs (Current):   Vitals:    20 0834   BP: 135/77   Pulse: 76   Resp: 20   Temp: 36.6 °C (97.8 °F)   TempSrc: Temporal   SpO2: 97%   Weight: 211 lb (95.7 kg)   Height: 6' (1.829 m)                                              BP Readings from Last 3 Encounters:   20 135/77   20 (!) 153/86   20 124/60       NPO Status:                           per pt > 8 hours                                                      BMI:   Wt Readings from Last 3 Encounters:   20 211 lb (95.7 kg)   20 211 lb (95.7 kg)   20 211 lb 2 oz (95.8 kg)     Body mass index is 28.62 kg/m².     CBC:   Lab Results   Component Value Date    WBC 8.1 2020    RBC 4.00 2020    HGB 11.5 2020    HCT 39.0 2020    MCV 97.5 2020    RDW 13.9 2020     2020       CMP:   Lab Results   Component Value Date     2020    K 5.4 2020     2020    CO2 23

## 2020-03-11 NOTE — BRIEF OP NOTE
Interventional Radiology Brief Postoperative Note    Gogo White Sr  YOB: 1934  [id number]    Pre-operative Diagnosis: RUL lung mass    Post-operative Diagnosis: RUL lung mass    Procedure: Biopsy of RUL lung mass    Drains: None    Anesthesia: Local and monitored anesthesia care (MAC)    Surgeons/Assistants: Glenna Lux    Estimated Blood Loss (mL): 1    Complications: None    Specimens: Cores    Findings: None    Electronically signed by Claudia Dwyer MD on 3/11/2020 at 10:52 AM

## 2020-03-11 NOTE — PROGRESS NOTES
6243   Patient arrived via  Ambulatory with family  to Radiology department for lung biopsy . Allergies, home medications, H&P and fasting instructions reviewed with patient. Vital signs taken. IV placed and flushed and prn adapter attached. 5716    Procedural instructions given, questions answered, understanding expressed and consent signed. Patient given fluoroscopy education, no questions at this time    1010   Patient arrived to Radiology recovery room for observation until discharged. Vital signs, dressing and pain assessment will be done every 15 minutes or as needed. Presently patient is comfortable with stable vitals. Dressing is clean, dry and intact. Patient to remain for 2 hours in recovery until a CXR confirms no abnormalities. 1045   RUE skin tear cleaned and dressed    1215   2 hour post procedure CXR completed. Enrique read and stated no abnormalities. 35 67 15   Patient prepared for discharge, up and dressed, denies discomfort, dressing over right upper back dry and intact, with a marked small area of shadowing. Discharge instructions reviewed with daughter and patient. All questions answered  to patients satisfaction. Taken to door via wheelchair and released home with daughter.

## 2020-03-11 NOTE — PROGRESS NOTES
Interventional Radiology Preprocedure Note    HPI:  Rosita Varela is a 80 y.o. male with a right upper lobe lung mas who was referred to Interventional Radiology for biopsy. Allergies:  No Known Allergies    Vital Signs:  Vitals:    03/11/20 0834 03/11/20 1010 03/11/20 1029   BP: 135/77 127/61 139/69   Pulse: 76 96 93   Resp: 20 20 22   Temp: 97.8 °F (36.6 °C) 98.7 °F (37.1 °C) 98.4 °F (36.9 °C)   TempSrc: Temporal Temporal Temporal   SpO2: 97% 96% 96%   Weight: 211 lb (95.7 kg)     Height: 6' (1.829 m)         Lab Results:    Lab Results   Component Value Date    INR 1.2 03/03/2020    PROTIME 13.3 (H) 03/03/2020    WBC 8.1 03/03/2020    HGB 11.5 (L) 03/03/2020    HCT 39.0 03/03/2020     03/03/2020     03/03/2020    K 5.4 (H) 03/03/2020     03/03/2020    CREATININE 1.3 (H) 03/03/2020    BUN 23 03/03/2020    CO2 23 03/03/2020    PSA SEE NOTE (AA) 02/19/2019    PSA 1.61 02/19/2019       Imaging:  I have personally reviewed the patient's pertinent imaging. RUL lung mass, FDG-avid on PET/CT. Assessment and Plan:  Biopsy of RUL lung mass. Informed Consent:  Informed consent was obtained from the patient. The procedure and its risks, benefits, and alternatives were explained. These risks include, but are not limited to, bleeding, hemoptysis, pneumothorax, and infection. The patient was given the opportunity to ask questions, indicated understanding, and gave permission to proceed.     Electronically signed by Asia Smyth MD on 3/11/2020 at 10:50 AM

## 2020-03-12 NOTE — ANESTHESIA POSTPROCEDURE EVALUATION
Department of Anesthesiology  Postprocedure Note    Patient: Sean Fuentes  MRN: [id number]  [place]: 1934  Date of evaluation: 3/11/2020  Time:  10:25 PM     Procedure Summary     Date:  03/11/20 Room / Location:  [address] Scan; [address] [place] [address] Procedures; [address] Radiology    Anesthesia Start:  6571 Anesthesia Stop:  1009    Procedures:       CT GUIDED NEEDLE PLACEMENT      CT NEEDLE BIOPSY LUNG PERCUTANEOUS Diagnosis:       Lung mass      Other nonspecific abnormal finding of lung field      (Lung mass)      (Lung mass)    Scheduled Providers:  [facility] General Radiologist Responsible Provider:  Gasper Fish MD    Anesthesia Type:  MAC ASA Status:  4          Anesthesia Type: MAC    Patricio Phase I:      Patricio Phase II:      Last vitals: Reviewed and per EMR flowsheets.        Anesthesia Post Evaluation    Patient location during evaluation: PACU  Patient participation: complete - patient participated  Level of consciousness: awake  Airway patency: patent  Nausea & Vomiting: no nausea and no vomiting  Complications: no  Cardiovascular status: hemodynamically stable  Respiratory status: acceptable  Hydration status: stable

## 2020-03-16 NOTE — TELEPHONE ENCOUNTER
Patient's daughter, Bessy Lawson, called in to office today to request CT Sim appointment-patient has yet to consult with Medical Oncology-specifically the [facility] referral that Dr Tristan Pavon placed on 2/18/2020, stating she is having difficulty getting through to the [facility] office to schedule. RN faxed referral as well as called office-order has arrived and the [facility] is in process of scheduling patient.

## 2020-03-17 NOTE — TELEPHONE ENCOUNTER
[facility] to check status of patient's consult appointment. Patient is scheduled for consult with Dr Halina Constantino on 3/26/20. Will request office visit notes after this appointment.

## 2020-03-31 NOTE — TELEPHONE ENCOUNTER
RN placed call to [facility] to request consult note-patient was seen on 3/26/2020 with follow up aapointment/lab draw/chemo administration to be completed on 4/6/2020. RN navigator and Dr Karla Avilez will be made aware of consult note as soon as it becomes avilable.

## 2020-04-17 PROBLEM — E16.2 HYPOGLYCEMIA: Status: ACTIVE | Noted: 2020-01-01

## 2020-04-17 NOTE — TELEPHONE ENCOUNTER
Patient's daughter called into the office today regarding her father's start of radiation therapy-Daughter needs to return to work, and will only be able to bring patient 3 days a week, specifically on Mon, Wed, and Thursday. RN will reach out to [employer] regarding possible rides on Tues/Friday-per daughter, patient is able to walk out from his house to driveway. Daughter also explained that patient started chemotherapy this week, stating that it has been too much for the patient to tolerate. It has been a struggle all week, with EMS being called several times to the patient's house since administration related to extreme blood glucose levels. Dr Brant Araujo will be updated regarding these developments.

## 2020-04-18 NOTE — SIGNIFICANT EVENT
Rapid Response Team Note  Date of event: 4/18/2020   Time of event: Cielo Joseph Sr 80y.o. year old male   YOB: 1934   Admit date:  4/17/2020   Location: 8510/8510-A   Witnessed? : [x]Yes  [] No  Monitored? : [x]Yes  [] No  Code status: [] Full  [x] DNR-CCA  []DNR-CC  ______________________________________________________________________  Reason for RRT:    RRT called for respiratory distress    Subjective:   Upon arrival the patient was lethargic. Patient was tachypneic with RR of 30 using accessory muscle. Patient was saturating 87% on NRB. He was hypertensive and tachycardic with HR of 120. On lung exam, breath sounds was diminished b/l and diffuse mild wheezing was noted. Stat CXR was unchanged. ABG showed primary respiratory acidosis and complicating metabolic acidosis. (7.087/72.6/243./21.4) Morphine 2 mg and versed 2 mg were given. He received IV Loppresor 5 mg for hypertension and tachycardia. Additionally Solumedrol 125 mg was also given. He was treated with half amp of D50. Blood work including BMP, mg, LA, troponin were sent. CTA chest was ordered to r/o PE in the setting of malignancy. Daugther was updated. PCP and Intensives were updated. Decision was made to observe patient closely in current unit.      Objective:   Vital signs: BP:159/81/RR:24 /HR:113/Temp: 98.3    / O2 Sat:  87 % on NRB  Repeat Vitals: BP:145/65 /HR:90  / O2 Sat:96% on NRB  · General Appearance: moderate distress  · Lung: diminished breath sounds bibasilar and wheezes bilaterally  · Heart: tachycardia, S1, S2 normal, no murmur, click, rub or gallop  · Abdomen: distended, rigid abd  · Extremities:  extremities normal, atraumatic, no cyanosis or edema    · Neurologic: Mental status: alertness: lethargic   NIHSS Score:       Response to pain:   [x] Yes  [] No  Follow commands:  [] Yes  [x] No  Facial asymmetry:  [] Yes  [x] No  Motor strength:  [x] Equal  [] Focal deficit:     Diagnostic Test:  Blood Sugar: 81 mg/dL  EKG:  No ST elevation     ABG:      Lab Results   Component Value Date    PH 7.087 04/18/2020    PCO2 72.6 04/18/2020    PO2 243.0 04/18/2020    HCO3 21.4 04/18/2020    O2SAT 99.4 04/18/2020        Imaging:   CXR: 4/18/2020   Right peritracheal paraspinal mass which was seen before is not well seen due to technique. Suspicion for early infiltrate in both lower lung and/or atelectasis. Laboratory Tests:     BMP:    Lab Results   Component Value Date     04/18/2020    K 6.0 04/18/2020    K 6.1 04/17/2020     04/18/2020    CO2 22 04/18/2020    BUN 60 04/18/2020    LABALBU 3.6 04/17/2020    CREATININE 2.2 04/18/2020    CALCIUM 9.1 04/18/2020    GFRAA 35 04/18/2020    LABGLOM 29 04/18/2020    GLUCOSE 174 04/18/2020     Magnesium:    Lab Results   Component Value Date    MG 1.8 04/18/2020     Troponin:    Lab Results   Component Value Date    TROPONINI 1.51 04/18/2020         Teams Assessment and Plan:    Acute on chronic hypoxic hypercapnic respiratory  - Place on AVAP. DNR CCA with no intubation  - [place] ABG after 1 hour  - Solumedrol 125 mg  - CTA chest r/o PE    Hypoglycemia  - Cont D10 100cc/hr  - monitor poct glc q2hr    Acute encephalopathy probably 2/2 hypercapnia   - Correct underlying cause   ?   Disposition:  [x] No transfer   [] Transfer to monitor floor  [] Transfer to: [] MICU [] NICU [] CCU [] SICU        Patients family updated: [x] Yes  [] No   Discussed with:  [x] Critical Care Intensivist: Dr. Jaspreet Brito        [x] Primary Care Provider: Evelyn Alejandra MD      [] Other: ?    Suhas Cortes MD, PGY-3  1:46 AM  Attending Physician: Dr. Jaspreet Brito

## 2020-04-18 NOTE — CONSULTS
(TYLENOL) 325 MG tablet Take 650 mg by mouth every 6 hours as needed for Pain   Yes Historical Provider, MD   Multiple Vitamins-Minerals (PRESERVISION AREDS PO) Take by mouth 2 times daily    Yes Historical Provider, MD   metformin (GLUMETZA) 1000 MG (MOD) ER tablet Take 1,000 mg by mouth 2 times daily (with meals). Yes Historical Provider, MD   metoprolol (TOPROL-XL) 25 MG XL tablet Take 25 mg by mouth daily. Yes Historical Provider, MD   clopidogrel (PLAVIX) 75 MG tablet Take 75 mg by mouth daily. Yes Historical Provider, MD   enalapril (VASOTEC) 2.5 MG tablet Take 2.5 mg by mouth 2 times daily    Yes Historical Provider, MD   allopurinol (ZYLOPRIM) 100 MG tablet Take 100 mg by mouth daily. Yes Historical Provider, MD   finasteride (PROSCAR) 5 MG tablet Take 5 mg by mouth daily Taking 3 times per week. Yes Historical Provider, MD   glimepiride (AMARYL) 2 MG tablet Take 1 mg by mouth every morning (before breakfast). Yes Historical Provider, MD   choline fenofibrate (TRILIPIX) 135 MG CPDR Take 135 mg by mouth daily. Yes Historical Provider, MD   atorvastatin (LIPITOR) 80 MG tablet Take 80 mg by mouth daily.       Historical Provider, MD       CURRENT MEDICATIONS:      Current Facility-Administered Medications:     midazolam (VERSED) injection 2 mg, 2 mg, Intravenous, Q3H PRN, Randy Aviles MD, 2 mg at 04/18/20 0413    morphine (PF) injection 2 mg, 2 mg, Intravenous, Q4H PRN, Randy Aviles MD, 2 mg at 04/18/20 0301    heparin (porcine) injection 5,000 Units, 5,000 Units, Subcutaneous, TID, Randy Aviles MD, 5,000 Units at 04/18/20 0159    methylPREDNISolone sodium (SOLU-MEDROL) injection 40 mg, 40 mg, Intravenous, Q6H, Jarvis Farmer DO, 40 mg at 04/18/20 0159    ipratropium-albuterol (DUONEB) nebulizer solution 1 ampule, 1 ampule, Inhalation, Q4H, Laban Scott, DO, 1 ampule at 04/18/20 0557    acetaminophen (TYLENOL) tablet 650 mg, 650 mg, Oral, Q6H PRN, Danna Grey MD  Ольга Welsh albuterol (PROVENTIL) nebulizer solution 2.5 mg, 2.5 mg, Nebulization, Q4H PRN, Millie Ha MD    allopurinol (ZYLOPRIM) tablet 100 mg, 100 mg, Oral, Daily, Millie Ha MD    budesonide-formoterol [facility]) 160-4.5 MCG/ACT inhaler 1 puff, 1 puff, Inhalation, BID, Millie Ha MD    fenofibrate (TRIGLIDE) tablet 160 mg, 160 mg, Oral, Daily, Millie Ha MD    clopidogrel (PLAVIX) tablet 75 mg, 75 mg, Oral, Daily, Millie Ha MD    enalapril (VASOTEC) tablet 2.5 mg, 2.5 mg, Oral, BID, Millie Ha MD    finasteride (PROSCAR) tablet 5 mg, 5 mg, Oral, Daily, Millie Ha MD    gabapentin (NEURONTIN) capsule 300 mg, 300 mg, Oral, TID, Millie Ha MD    metoprolol succinate (TOPROL XL) extended release tablet 25 mg, 25 mg, Oral, Daily, Millie Ha MD    antioxidant multivitamin (OCUVITE) tablet, 1 tablet, Oral, BID, Millie Ha MD    tamsulosin [facility]) capsule 0.4 mg, 0.4 mg, Oral, Daily, Millie Ha MD    dextrose 10 % infusion, , Intravenous, Continuous, Marlyn Fong MD, Last Rate: 100 mL/hr at 04/18/20 0457    glucose (GLUTOSE) 40 % oral gel 15 g, 15 g, Oral, PRN, Braden Costa, DO, 15 g at 04/18/20 0008    dextrose 50 % IV solution, 12.5 g, Intravenous, PRN, Braden Napolestti, DO    glucagon (rDNA) injection 1 mg, 1 mg, Intramuscular, PRN, Braden Igor, DO    dextrose 5 % solution, 100 mL/hr, Intravenous, PRN, Braden Igor, DO    sodium chloride flush 0.9 % injection 10 mL, 10 mL, Intravenous, 2 times per day, Braden Igor, DO    sodium chloride flush 0.9 % injection 10 mL, 10 mL, Intravenous, PRN, Braden Napolestti, DO, 10 mL at 04/18/20 0450      ALLERGIES:  Patient has no known allergies. SOCIAL HISTORY:    Unable to obtain at this time due to patient's altered mental status. FAMILY HISTORY:   Unable to obtain at this time due to patient's altered mental status.       REVIEW OF on the left  with associated atelectasis  Coronary calcification suggesting of coronary artery disease  Cholelithiasis. CXR 04/18/2020: Impression:  Cardiomegaly  Right peritracheal paraspinal mass which was seen before is not well  seen due to technique  Suspicion for early infiltrate in both lower lung and/or atelectasis. Chest CTA: Pending. Intake/Output Summary (Last 24 hours) at 4/18/2020 0815  Last data filed at 4/18/2020 0603  Gross per 24 hour   Intake 1189 ml   Output 2650 ml   Net -1461 ml       Labs:   CBC:   Recent Labs     04/17/20 1909   WBC 6.5   HGB 11.1*   HCT 37.5        BMP:   Recent Labs     04/17/20 1909 04/18/20  0046 04/18/20  0047     --   --  134   K 6.1*   < > 5.67* 6.0*   CO2 22  --   --  22   BUN 68*  --   --  60*   CREATININE 2.1*  --   --  2.2*   LABGLOM 30  --   --  29   CALCIUM 9.0  --   --  9.1    < > = values in this interval not displayed. Mag:   Recent Labs     04/18/20  0047   MG 1.8     PT/INR:   Recent Labs     04/17/20 1909   PROTIME 12.6*   INR 1.1     APTT:  Recent Labs     04/17/20 1909   APTT 34.6     CARDIAC ENZYMES:  Recent Labs     04/17/20 1909 04/18/20  0047   TROPONINI 1.68* 1.51*     LIVER PROFILE:  Recent Labs     04/17/20 1909   AST 23   ALT 12   LABALBU 3.6             Assessment and plan to follow as per Dr. Farshad Agrawal.         Electronically signed by Rebecca Raya PA-C on 4/18/2020 at 8:15 AM

## 2020-04-18 NOTE — PROGRESS NOTES
Spoke with patient's daughter, Joe Schlatter regarding home medications. Daughter will bring updated home medication list along with POA papers and patient's living will to hospital this a.m. and drop off in main lobby.

## 2020-04-18 NOTE — PROGRESS NOTES
mass/p biopsy  Right lung, upper lobe, core biopsy: Invasive nonkeratinizing squamous carcinoma  Diabetes mellitus; Unspecified cerebral artery occlusion with cerebral infarction; Heart attack; Hyperlipemia;   Gout;   BPH/ Prostate enlargement. PLAN:  Continue antibiotics  Monitor BC and BS  Steroids Q6  Bronchodilators Q4  Follow troponin for Type II STEMI  BIPAP to AVAPS   Repeat ABG in 1 after above to see if correction  Try to get more history as nothing in EPIC here or Care everywhere    Code Status:  I have personally discussed Code Status with this patient, and believe that this patient (or their surrogate decision maker) understand their medical condition, their prognosis and the consequences to the Code Status decision.    Dread Burnette Sr current code status is: No not intubate    Ca Simms DO, MPH., FCCP, [facility], [address]

## 2020-04-18 NOTE — ED NOTES
BS 69, 2 tubes of glucose given, pt slight confused. Repeat BS 89.  To ct with this nurse     Hien Olmos, RN  04/17/20 8316

## 2020-04-19 NOTE — PROGRESS NOTES
2.5 mg Nebulization Q4H PRN Lon Lebron MD        allopurinol (ZYLOPRIM) tablet 100 mg  100 mg Oral Daily Lon Lebron MD   100 mg at 04/19/20 0809    fenofibrate (TRIGLIDE) tablet 160 mg  160 mg Oral Daily Lon Lebron MD   160 mg at 04/19/20 0810    clopidogrel (PLAVIX) tablet 75 mg  75 mg Oral Daily Lon Lebron MD   75 mg at 04/19/20 4960    finasteride (PROSCAR) tablet 5 mg  5 mg Oral Daily Lon Lebron MD   5 mg at 04/19/20 3442    gabapentin (NEURONTIN) capsule 300 mg  300 mg Oral TID Lon Lebron MD   300 mg at 04/19/20 0810    metoprolol succinate (TOPROL XL) extended release tablet 25 mg  25 mg Oral Daily Lon Lebron MD   25 mg at 04/19/20 0810    antioxidant multivitamin (OCUVITE) tablet  1 tablet Oral BID Lon Lebron MD   1 tablet at 04/19/20 0810    tamsulosin (FLOMAX) capsule 0.4 mg  0.4 mg Oral Daily Lon Lebron MD   0.4 mg at 04/19/20 0809    perflutren lipid microspheres (DEFINITY) injection 1.65 mg  1.5 mL Intravenous ONCE PRN [place] PA-C        midazolam (VERSED) injection 1 mg  1 mg Intravenous Q8H PRN Inna Farmer DO   1 mg at 04/18/20 1146    glucose (GLUTOSE) 40 % oral gel 15 g  15 g Oral PRN Verlyn Brazil, DO   15 g at 04/18/20 0008    dextrose 50 % IV solution  12.5 g Intravenous PRN Verlyn Brazil, DO        glucagon (rDNA) injection 1 mg  1 mg Intramuscular PRN Verlyn Shelburn, DO        dextrose 5 % solution  100 mL/hr Intravenous PRN Verlyn Shelburn, DO        sodium chloride flush 0.9 % injection 10 mL  10 mL Intravenous 2 times per day Verlyn Brazil, DO   10 mL at 04/19/20 0810    sodium chloride flush 0.9 % injection 10 mL  10 mL Intravenous PRN Verlyn Shelburn, DO   10 mL at 04/18/20 0450      dextrose         Physical Exam:  /68   Pulse 162   Temp 97.7 °F (36.5 °C) (Oral)   Resp 20   Ht 6' (1.829 m)   Wt 211 lb (95.7 kg)   SpO2 99%   BMI 28.62 kg/m² Wt Readings from Last 3 Encounters:   04/17/20 211 lb (95.7 kg)   03/11/20 211 lb (95.7 kg)   03/03/20 211 lb (95.7 kg)     Appearance: Awake, alert, no acute respiratory distress  Skin: Intact, no rash  Head: Normocephalic, atraumatic  Eyes: EOMI, no conjunctival erythema  ENMT: No pharyngeal erythema, MMM, no rhinorrhea  Neck: Supple, no elevated JVP, no carotid bruits  Lungs: Clear to auscultation bilaterally. No wheezes, rales, or rhonchi.   Cardiac: Regular rate and rhythm, +S1S2, no murmurs apparent  Abdomen: Soft, nontender, +bowel sounds  Extremities: Moves all extremities x 4, no lower extremity edema  Neurologic: No focal motor deficits apparent, normal mood and affect  Peripheral Pulses: Intact posterior tibial pulses bilaterally    Intake/Output:    Intake/Output Summary (Last 24 hours) at 4/19/2020 1223  Last data filed at 4/19/2020 1138  Gross per 24 hour   Intake 3279.48 ml   Output 5950 ml   Net -2670.52 ml     I/O this shift:  In: 10 [I.V.:10]  Out: 750 [Urine:750]    Laboratory Tests:  Recent Labs     04/18/20 0047 04/18/20  1013 04/19/20  0803    132 134   K 6.0* 6.1* 5.7*    101 99   CO2 22 21* 25   BUN 60* 58* 53*   CREATININE 2.2* 2.0* 1.8*   GLUCOSE 174* 152* 178*   CALCIUM 9.1 8.7 8.8     Lab Results   Component Value Date    MG 1.8 04/18/2020     Recent Labs     04/17/20 1909 04/18/20  1013 04/19/20  0803   ALKPHOS 59 51 43   ALT 12 12 12   AST 23 19 17   PROT 6.3* 6.0* 5.9*   BILITOT 0.3 0.4 0.4   BILIDIR  --  0.3  --    LABALBU 3.6 3.2* 3.3*     Recent Labs     04/17/20 1909 04/18/20  1013 04/19/20  0803   WBC 6.5 15.5* 7.7   RBC 3.78* 3.61* 3.41*   HGB 11.1* 10.7* 10.0*   HCT 37.5 35.1* 31.9*   MCV 99.2 97.2 93.5   MCH 29.4 29.6 29.3   MCHC 29.6* 30.5* 31.3*   RDW 14.1 14.1 13.7    171 181   MPV 10.4 10.2 10.7     Lab Results   Component Value Date    TROPONINI 1.29 (H) 04/18/2020    TROPONINI 1.29 (H) 04/18/2020    TROPONINI 1.51 (H) 04/18/2020     Lab Results

## 2020-04-20 NOTE — PROGRESS NOTES
[facility]    79 yo male with history of COPD, recently diagnosed lung cancer admitted 4/17 with altered mental status associated with hypoglycemia and acute on chronic respiratory failure. CC/Overnight events:   Feels better. Comfortable with breathing (on NC 3 L) but admits he has not been out of bed or exerted himself much. Minimal cough, mostly non-productive. No chest or abdominal pain, no nausea; appetite is good.      Current Facility-Administered Medications   Medication Dose Route Frequency Provider Last Rate Last Dose    dilTIAZem (CARDIZEM) tablet 60 mg  60 mg Oral 4 times per day Chloe Mccarty MD   60 mg at 04/20/20 1213    methylPREDNISolone sodium (SOLU-MEDROL) injection 20 mg  20 mg Intravenous Q12H Tata Farmer DO   20 mg at 04/20/20 1213    ipratropium (ATROVENT) 0.02 % nebulizer solution 0.5 mg  0.5 mg Nebulization 4x daily [facility] [place] Farmer DO   0.5 mg at 04/20/20 0936    furosemide (LASIX) injection 40 mg  40 mg Intravenous Daily Micheal Daniel MD   40 mg at 04/20/20 0831    budesonide-formoterol (SYMBICORT) 160-4.5 MCG/ACT inhaler 2 puff  2 puff Inhalation BID Zelalem Stone MD   2 puff at 04/20/20 0831    enoxaparin (LOVENOX) injection 40 mg  40 mg Subcutaneous Daily Carloz Sanchez MD   40 mg at 04/20/20 0831    morphine (PF) injection 2 mg  2 mg Intravenous Q4H PRN Randy Aviles MD   2 mg at 04/18/20 0301    acetaminophen (TYLENOL) tablet 650 mg  650 mg Oral Q6H PRN Danna Grey MD   650 mg at 04/18/20 1146    albuterol (PROVENTIL) nebulizer solution 2.5 mg  2.5 mg Nebulization Q4H PRN Danna Grey MD        allopurinol (ZYLOPRIM) tablet 100 mg  100 mg Oral Daily Danna Grey MD   100 mg at 04/20/20 0831    fenofibrate (TRIGLIDE) tablet 160 mg  160 mg Oral Daily Danna Grey MD   160 mg at 04/20/20 0831    clopidogrel (PLAVIX) tablet 75 mg  75 mg Oral Daily Danna Grey MD   75 mg at

## 2020-04-20 NOTE — PROGRESS NOTES
gabapentin (NEURONTIN) capsule 300 mg  300 mg Oral TID Tamia Dow MD   300 mg at 04/19/20 2007    metoprolol succinate (TOPROL XL) extended release tablet 25 mg  25 mg Oral Daily Tamia Dow MD   25 mg at 04/19/20 0810    antioxidant multivitamin (OCUVITE) tablet  1 tablet Oral BID Tamia Dow MD   1 tablet at 04/19/20 1730    tamsulosin (FLOMAX) capsule 0.4 mg  0.4 mg Oral Daily Tamia Dow MD   0.4 mg at 04/19/20 0809    perflutren lipid microspheres (DEFINITY) injection 1.65 mg  1.5 mL Intravenous ONCE PRN [facility], [facility]        midazolam (VERSED) injection 1 mg  1 mg Intravenous Q8H PRN [facility], DO   1 mg at 04/18/20 1146    glucose (GLUTOSE) 40 % oral gel 15 g  15 g Oral PRN Tod Cool, DO   15 g at 04/18/20 0008    dextrose 50 % IV solution  12.5 g Intravenous PRN Tod Cool, DO        glucagon (rDNA) injection 1 mg  1 mg Intramuscular PRN Tod Cool, DO        dextrose 5 % solution  100 mL/hr Intravenous PRN Tod Cool, DO        sodium chloride flush 0.9 % injection 10 mL  10 mL Intravenous 2 times per day Tod Cool, DO   10 mL at 04/19/20 2007    sodium chloride flush 0.9 % injection 10 mL  10 mL Intravenous PRN Tod Cool, DO   10 mL at 04/18/20 0450     No Known Allergies  Active Problems:    Hypoglycemia    COPD exacerbation (HCC)    Acute respiratory failure with hypoxia and hypercapnia (HCC)  Resolved Problems:    * No resolved hospital problems. *    Blood pressure 129/64, pulse 105, temperature 97 °F (36.1 °C), temperature source Temporal, resp. rate 20, height 6' (1.829 m), weight 211 lb (95.7 kg), SpO2 100 %. Subjective:  Symptoms:  Stable. He reports shortness of breath. Diet:  Poor intake. Activity level: Impaired due to weakness. Pain:  He reports no pain. Objective:  General Appearance:  Comfortable.     Vital signs: (most recent): Blood pressure 129/64, pulse 105, temperature 97 °F (36.1 °C), temperature source Temporal, resp. rate 20, height 6' (1.829 m), weight 211 lb (95.7 kg), SpO2 100 %. No fever. Lungs: There are decreased breath sounds. Heart: Tachycardia. Regular rhythm. Assessment:  (Hypoglycemia  Acute hypoxic respiratory failure  COPD exacerbation  Lung ca  DM  Elevated troponin  Hyperlipidemia        Plan:  Pulmonary following. Cardiology to do ECHO. Abx, steroids, nebs. Monitor labs. ).        Tamia Dow MD  4/20/2020

## 2020-04-20 NOTE — PROGRESS NOTES
3L/min with RN clearance- SPO2 increased to 95%. Patient education  Pt educated on role of therapy, safety with mobility, pursed lip breathing technique    Patient response to education:   Pt verbalized understanding Pt demonstrated skill Pt requires further education in this area   yes yes yes     ASSESSMENT:    Comments:  Pt resting semi-supine upon arrival, agreeable to PT eval. Pt requires assistance for trunk lift for supine>sit ad cues to avoid breath holding. Pt denies c/o dizziness with changes in position. He requires cues for hand placement during sit<>stand transfers and light lift assistance and steadying assistance. Pt demonstrates forward flexed posture and downward gaze during all ambulation with shortened step length, shifted towards R side of walker despite cues/assistance to correct. Pt has tendency to progress FWW too far forward during all ambulation and step laterally outside R posterior boundary of walker during turns. Pt SPO2 recorded as noted above with reports of SOB, resolved upon completion of session and pt was seated in chair with needs in reach and chair alarm set. RN notified of pt performance and positioning. Pt will benefit from continued skilled PT services to improve independence with all bed mobility and transfers, balance, improved gait mechanics and AD management, increase endurance, and initiate stair negotiation as appropriate. Treatment:  Patient practiced and was instructed in the following treatment:     Therapeutic activities: bed mobility and transfer training with cues for hand placement and to avoid breath holding, education for pursed lip breathing with skilled monitoring of SPO2   Gait training: cues for upright posture, walker approximation, increased step length and widened SHARMIN. Verbal/tactile cues required for walker management to avoid stepping outside boundaries of walker particularly noted during turning. Pt's/ family goals   1.  To return home independently    Patient and or family understand(s) diagnosis, prognosis, and plan of care. yes    PLAN:    PT care will be provided in accordance with the objectives noted above. Exercises and functional mobility practice will be used as well as appropriate assistive devices or modalities to obtain goals. Patient and family education will also be administered as needed. Frequency of treatments: 2-5x/week x 1-2 weeks. Time in  1349  Time out  1413    Total Treatment Time  20 minutes     Evaluation Time includes thorough review of current medical information, gathering information on past medical history/social history and prior level of function, completion of standardized testing/informal observation of tasks, assessment of data and education on plan of care and goals.     CPT codes:  [] Low Complexity PT evaluation 28404  [x] Moderate Complexity PT evaluation 12701  [] High Complexity PT evaluation 22696  [] PT Re-evaluation 98699  [] Gait training 98505 0 minutes  [] Manual therapy 17453 0 minutes  [x] Therapeutic activities 86958 20 minutes  [] Therapeutic exercises 29669 0 minutes  [] Neuromuscular reeducation 85686 0 minutes     Zach Sanchez, PT, DPT  [id number]

## 2020-04-20 NOTE — CONSULTS
apparently last Monday with no  medications. The patient also has history of severe COPD, on 3 L of  oxygen. Uses Symbicort and ipratropium at home. He as well has past  medical history of chronic pain as well as peripheral vascular disease,  coronary artery disease. MEDICATIONS AT HOME:  Include use of Flomax, Symbicort, DuoNeb,  albuterol, gabapentin, metoprolol, Plavix, Vasotec, allopurinol,  Proscar, Amaryl for his diabetes. PHYSICAL EXAMINATION:  GENERAL:  The patient is obese. He is in no respiratory distress, right  now on 4 liters with saturation 98%, 99%. VITAL SIGNS:  Blood pressure 116/60, heart rate between 90 to 170,  improved between 80 and 90s. Afebrile, 97 to 98 degrees. Respiratory  rate between 15 and 20. HEENT:  Oral mucosa is moist.  NECK:  Supple. No JVD. No carotid bruit. LUNGS:  Auscultation of the chest at this time:  Decreased breath sounds  at the bases. Slightly prolonged expiratory phase. ABDOMEN:  Fatty and soft. EXTREMITIES:  Symmetric with superficial varices seen. No cyanosis. No  clubbing. No tenderness. No edema. IMAGING DATA:  Chest x-ray seen by myself showed the presence of chronic  changes, questionable haziness at the level of the bases. There are no  infiltrates at the level of the CAT scan which was done on admission  showed a large right paravertebral mass. Hard to say if the patient has  new infiltrates due to pneumonia. LABORATORY DATA:  Blood work showed a sodium of 134, potassium 5.7, BUN  and creatinine 53 and 1.8 coming down from 60 and 2.1 initially. Blood  glucose now increased, 300 to 200, initially was between 30 and 70s. ProBNP between 15,000 to 18,000. CBC:  White blood count 7.7, H and H  10 and 31, and platelet count was 420. COVID test on admission was  negative. UA was clear.   The patient has arterial blood gas yesterday  earlier showed pH 7.08, pCO2 72, pO2 243, saturation 94% on 15 L,  nonrebreather mask, improved with the use of AVAP and subsequently went  off AVAP, improvement seen at the level of CO2 gas exchange. ASSESSMENT AND PLAN:  At this time, assessment and plan are the  following: The patient presented with altered mental status secondary  to hypoglycemia complicated with acute-on-chronic respiratory failure,  possible acute exacerbation of COPD. The patient feels much better when  we started back him on Symbicort. At this time, we will treat with the  use of noninvasive ventilator at bedtime and p.r.n. The steroid has  been tapered right now, still to only twice a day which we will be able  to switch to p.o. in the morning. The patient presented with a brief  episode of supraventricular tachycardia and Cardiology has been  consulted. We are going to follow their recommendation. Due to the  presence of SVT and acute-on-chronic respiratory failure especially with  underlying malignancy, the patient is high risk of VTE. We have already  ordered venous Doppler. The patient may need full anticoagulation,  gentle IV fluid for 24 hours.         Halley Cervantes MD    D: 04/19/2020 13:58:10       T: 04/19/2020 16:03:12     MB/RADHA_MARIA FERNANDA_ERNESTO  Job#: [id number]     Doc#: [id number]

## 2020-04-21 NOTE — PROGRESS NOTES
to supine: Supervision    Functional Transfers Min A  Various surfaces  Supervision   Functional Mobility Min A w/ w/w  In bathrm/room  Supervision w/ w/w   Balance Sitting: SBA (static)  CGA (dynamic)  Standing: Min A w/ w/w     Activity Tolerance Fair-  Fair+   Visual/  Perceptual Glasses: yes, readers (not present)                Hand dominance: R   Strength ROM Additional Info:    RUE  Distal: 4-/5  Deferred proximal d/t R shoulder pain WFL   good  and wfl FMC/dexterity noted during ADL tasks       LUE 4-/5  WFL   good  and wfl FMC/dexterity noted during ADL tasks       Hearing: WFL   Sensation:  c/o numbness/tingling B feet (d/t neuropathy)  Tone: WFL   Edema: none noted                   Treatment: Upon arrival, patient seated in chair. Pt agreeable to OT session this date. Facilitation of functional transfers (various surfaces (bedside chair, toilet, EOB) w/ education/cues for safety/hand placement and rest breaks), standing tolerance tasks w/ w/w (addressing posture, balance and activity tolerance while incorporating light functional reaching), functional ambulation task with w/w (in room/bathrm w/ education/cuing on posture, w/w management, energy conservation and safety) and bed mobility (sit EOB>supine w/ education/cues for body mechanics). Therapist facilitated self-care retraining: UB/LB self-care tasks (gown, socks), toileting task (including bowel management) and standing grooming tasks at sink while educating pt on modified techniques, posture, safety and energy conservation techniques. Skilled monitoring of HR, O2 sats and pts response to treatment (Pt on 3L O2 via nasal cannula. O2 sat=98% at rest, decreased to 87% once seated on commode. Monitored until remained ~92%. Pt c/o mild SOB at that time. O2 sat decreased again to 88% seated EOB - following commode transfers/ambulation. Quick recovery to WNL).      At end of session, patient lying in bed (bed alarm on) with call light and phone

## 2020-04-21 NOTE — PROGRESS NOTES
Deborrah Gitelman is a 80 y.o. male patient. Patient resting at this time.     Current Facility-Administered Medications   Medication Dose Route Frequency Provider Last Rate Last Dose    metoprolol succinate (TOPROL XL) extended release tablet 50 mg  50 mg Oral BID Alyssa Meraz MD        predniSONE (DELTASONE) tablet 30 mg  30 mg Oral Daily Rush Casey MD        apixaban Lynn Stagers) tablet 10 mg  10 mg Oral BID David Hayden MD   10 mg at 04/20/20 2020    ipratropium (ATROVENT) 0.02 % nebulizer solution 0.5 mg  0.5 mg Nebulization 4x daily Davis Siemens Darian, DO   0.5 mg at 04/20/20 1952    furosemide (LASIX) injection 40 mg  40 mg Intravenous Daily Brando Zarate MD   40 mg at 04/20/20 0831    budesonide-formoterol (SYMBICORT) 160-4.5 MCG/ACT inhaler 2 puff  2 puff Inhalation BID Shine Cheung MD   2 puff at 04/20/20 2222    morphine (PF) injection 2 mg  2 mg Intravenous Q4H PRN Meagan Hamilton MD   2 mg at 04/18/20 0301    acetaminophen (TYLENOL) tablet 650 mg  650 mg Oral Q6H PRN David Hayden MD   650 mg at 04/18/20 1146    albuterol (PROVENTIL) nebulizer solution 2.5 mg  2.5 mg Nebulization Q4H PRN David Hayden MD        allopurinol (ZYLOPRIM) tablet 100 mg  100 mg Oral Daily David Hayden MD   100 mg at 04/20/20 0831    fenofibrate (TRIGLIDE) tablet 160 mg  160 mg Oral Daily David Hayden MD   160 mg at 04/20/20 0831    clopidogrel (PLAVIX) tablet 75 mg  75 mg Oral Daily David Hayden MD   75 mg at 04/20/20 0831    finasteride (PROSCAR) tablet 5 mg  5 mg Oral Daily David Hayden MD   5 mg at 04/20/20 0831    gabapentin (NEURONTIN) capsule 300 mg  300 mg Oral TID David Hayden MD   300 mg at 04/20/20 2020    antioxidant multivitamin (OCUVITE) tablet  1 tablet Oral BID David Hayden MD   1 tablet at 04/20/20 1809    tamsulosin (FLOMAX) capsule 0.4 mg  0.4 mg Oral Daily David Hayden MD   0.4 mg at

## 2020-04-21 NOTE — PROGRESS NOTES
2.5 mg, 2.5 mg, Nebulization, Q4H PRN, Martín Ac MD    allopurinol [facility]) tablet 100 mg, 100 mg, Oral, Daily, Martín Ac MD, 100 mg at 04/20/20 0831    fenofibrate (TRIGLIDE) tablet 160 mg, 160 mg, Oral, Daily, Martín Ac MD, 160 mg at 04/20/20 0831    clopidogrel (PLAVIX) tablet 75 mg, 75 mg, Oral, Daily, Martín Ac MD, 75 mg at 04/20/20 0831    finasteride (PROSCAR) tablet 5 mg, 5 mg, Oral, Daily, Martín Ac MD, 5 mg at 04/20/20 0831    gabapentin (NEURONTIN) capsule 300 mg, 300 mg, Oral, TID, Martín Ac MD, 300 mg at 04/20/20 2020    antioxidant multivitamin (OCUVITE) tablet, 1 tablet, Oral, BID, Martín Ac MD, 1 tablet at 04/20/20 1809    tamsulosin [facility]) capsule 0.4 mg, 0.4 mg, Oral, Daily, Martín Ac MD, 0.4 mg at 04/20/20 0831    perflutren lipid microspheres (DEFINITY) injection 1.65 mg, 1.5 mL, Intravenous, ONCE PRN, [place] PA-C    midazolam (VERSED) injection 1 mg, 1 mg, Intravenous, Q8H PRN, Ten Farmer, DO, 1 mg at 04/18/20 1146    glucose (GLUTOSE) 40 % oral gel 15 g, 15 g, Oral, PRN, Carles Huber, DO, 15 g at 04/18/20 0008    dextrose 50 % IV solution, 12.5 g, Intravenous, PRN, Carles Huber, DO    glucagon (rDNA) injection 1 mg, 1 mg, Intramuscular, PRN, Carles Uhber, DO    dextrose 5 % solution, 100 mL/hr, Intravenous, PRN, Carles Huber, DO    sodium chloride flush 0.9 % injection 10 mL, 10 mL, Intravenous, 2 times per day, Carles Huber, DO, 10 mL at 04/20/20 2020    sodium chloride flush 0.9 % injection 10 mL, 10 mL, Intravenous, PRN, Carles Huber, DO, 10 mL at 04/21/20 0017    Physical Exam:  /62   Pulse 71   Temp 96.4 °F (35.8 °C) (Temporal)   Resp 18   Ht 6' (1.829 m)   Wt 211 lb (95.7 kg)   SpO2 99%   BMI 28.62 kg/m²   Wt Readings from Last 3 Encounters:   04/17/20 211 lb (95.7 kg)   03/11/20 211 lb (95.7 kg)   03/03/20 211 lb (95.7 kg) coronary calcification on chest CT  10. 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 4)    RECOMMENDATIONS:     Increase metoprolol succinate to 50 mg p.o. twice daily   Discontinue diltiazem given LV dysfunction   Continue IV furosemide, closely monitoring I's and O's and renal function   Treatment dose apixaban for acute DVT   Continue clopidogrel   No further ischemic evaluation planned due to DNR status, age and multiple comorbidities, and remains chest pain-free at this time    [facility], [address] [place] [address] Cardiology    NOTE: This report was transcribed using voice recognition software. Every effort was made to ensure accuracy; however, inadvertent computerized transcription errors may be present.

## 2020-04-21 NOTE — PROGRESS NOTES
Physical Therapy  Facility/Department: [facility] NEURO IM  Daily Treatment Note  NAME: Berenice Bowman Sr  : 1934  MRN: [id number]    Date of Service: 2020    Referring Provider:  Danna Grey MD     Date of Service: 2020     Evaluating PT:  Martín Tran PT, DPT [id number]        Room #:  [facility]/2537-E  Diagnosis:  Hypoglycemia  PMHx/PSHx:  Diabetes mellitus, CVA, heart attack, HLD, gout, HTN, prostate enlargement, COPD, cancer  Procedure/Surgery:  None this admission  Precautions:  Fall risk, 2-3 L/min O2  Equipment Needs: To be determined     SUBJECTIVE:     Pt lives alone in a single story apartment with 6 stairs to enter and 1 rail. Bed is on first floor and bath is on first floor. Pt ambulated with no AD prior to admission. Pt owns SPC and FWW.     OBJECTIVE:    Initial Evaluation  Date: 20 Treatment  20 Short Term/ Long Term   Goals   AM-PAC 6 Clicks 76/05  94/08     Was pt agreeable to Eval/treatment? yes  yes     Does pt have pain? No c/o pain throughout session No c/o pain      Bed Mobility  Rolling: SBA  Supine to sit: Min A  Sit to supine: NT  Scooting: SBA toward EOB  NT, pt sitting in chair upon arrival. Rolling: Mod Independent   Supine to sit: Mod Independent   Sit to supine: Mod Independent   Scooting: Mod Independent    Transfers Sit to stand: Min A  Stand to sit: Min A  Stand pivot: Min A with FWW  Sit to stand: Min A  Stand to sit: Min A  Stand pivot: CGA with FWW Sit to stand: Mod Independent   Stand to sit: Mod Independent   Stand pivot:  Mod Independent    Ambulation    50 feet with FWW with Min A 60 feet x2, 12 feet x2 with FWW with CGA >100 feet with AAD with Supervision   Stair negotiation: ascended and descended  NT NT  6 steps with 1 rail unremarkable   ROM BUE:  WNL  BLE:  WNL       Strength BUE:  WNL  BLE:  WNL       Balance Sitting EOB:  Supervision  Dynamic Standing:  Min A with FWW  Sitting EOB:  Supervision  Dynamic Standing:  Min A with FWW Sitting EOB:  Mod Independent  Dynamic Standing: Mod Independent       Pt is A & O x 3  Sensation:  Pt denies numbness and tingling to extremities  Edema:  Unremarkable    Vitals:  SPO2 100% on 3L at rest, decreased to 87% upon completion of initial ambulation distance- increased to 90% with 2 minutes pursed lip breathing, remaining at 95% at end of session. Therapeutic Exercises:  Seated ankle pumps x30  LAQ 10x3 ea  Hip adduction with folded pillow    Patient education  Pt educated on breathing technique, improved walker management, completion of TE's, activity pacing    Patient response to education:   Pt verbalized understanding Pt demonstrated skill Pt requires further education in this area   yes yes yes     ASSESSMENT:    Comments:  Pt seated in chair upon arrival, agreeable to PT session this AM. Pt demonstrates improved endurance this AM with increased ambulation distance and maintaining SPO2 as noted above. Pt continues to have lowered oxygen with ambulation and requires cues for pursed lip breathing. Pt requires standing rest break between ambulation distances to allow SPO2 to recover with cues for breathing technique. Pt tolerated exercises with brief breaks between sets. Pt was agreeable to amb again to/from restroom and requires cues for walker placement in confined spaces. Pt seated in chair upon completion of session with all needs in reach and chair alarm set. Treatment:  Patient practiced and was instructed in the following treatment:     Transfer training: cues for hand placement and transition on/off walker, cues for use of grab bars in restroom for sit<>stand from toilet.  Therapeutic exercises: cues for breathing throughout, verbal/visual cues for technique to complete the above TE's, skilled monitoring of SPO2   Gait training: cues for walker approximation, upright posture, widened SHARMIN and to avoid stepping laterally outside SHARMIN. Skilled monitoring of SPO2.     PLAN:    Patient is making good progress towards established goals. Will continue with current POC.         PLAN:    Time in  0925  Time out  0955    Total Treatment Time  30    CPT codes:  [x] Gait training 09108 12 minutes  [] Manual therapy 83670 0 minutes  [x] Therapeutic activities 76597 18 minutes  [] Therapeutic exercises 04655 0 minutes  [] Neuromuscular reeducation 49391 0 minutes      Steffanie Denney PT, DPT  [id number]

## 2020-04-21 NOTE — CARE COORDINATION
SOCIAL WORK/DISCHARGE PLANNING;  Spoke with pt remotely due to co-vid. Pt seemed alert and oriented. He answered questions appropriately. Pt states he is sitting up in chair after working with therapy. He resides alone in a one floor basement apartment. There are about six steps down with handrail. Pt reports being independent prior to admission -and still drives, usually during the day. He has a daughter Evelina([id number]), that lives in the area. I spoke with her via phone. She works in pharmacy at [employer]. She states she has arranged her schedule to work evenings so she will be able to be with pt during the day. Pt also has a friend that lives in his building that comes down and spend the day with him. Pt would like to return home at discharge. He is agreeable to having [place] [phone] services and home pt/ot. Pt offered [place] [phone] choices. He states no preference and is receptive to [facility]. Will need HHC order prior to discharge. Pt has/use oxygen at home(3L) but daughter is not sure of which company. Pt has access to a wheeled walker for home. Plan will be home at discharge with assist from his daughter and friend and [place] [phone]. Pt's pcp is Dr. Nubia Pascal and [facility]. Pt's daughter states pt was to start Radiation this Thursday but she has put that on hold as well as his chemo.   Matias Chin [facility]

## 2020-04-22 NOTE — CARE COORDINATION
Notified Sammy Finney at [facility] that patient will discharge home today. Orders noted for [facility]. They will follow up after discharge.

## 2020-04-22 NOTE — DISCHARGE SUMMARY
Use as directed with nebulized medication. Qty: 1 each, Refills: 0    Comments: Supply prescription to Pharmacy or [address] [place] [address] location of patient or coverage preference. Dispense full nebulizer kit - compressor, tubing, mouthpiece, mask, ancillary supplies - per requirements of ordered product, patient preference, or coverage allowances. !! - Potential duplicate medications found. Please discuss with provider. CONTINUE these medications which have CHANGED    Details   metoprolol succinate (TOPROL XL) 50 MG extended release tablet Take 1 tablet by mouth 2 times daily  Qty: 30 tablet, Refills: 3         CONTINUE these medications which have NOT CHANGED    Details   tamsulosin (FLOMAX) 0.4 MG capsule Take 0.4 mg by mouth daily       budesonide-formoterol (SYMBICORT) 80-4.5 MCG/ACT AERO Inhale 2 puffs into the lungs 2 times daily      ipratropium-albuterol (DUONEB) 0.5-2.5 (3) MG/3ML SOLN nebulizer solution Inhale 1 vial into the lungs 4 times daily      ipratropium (ATROVENT) 0.06 % nasal spray 2 sprays by Each Nostril route 2 times daily      albuterol sulfate  (90 Base) MCG/ACT inhaler Inhale 2 puffs into the lungs every 6 hours as needed for Wheezing      gabapentin (NEURONTIN) 300 MG capsule Take 300 mg by mouth 3 times daily. Indications: 1 tab in AM , 2 tabs at night      acetaminophen (TYLENOL) 325 MG tablet Take 650 mg by mouth every 6 hours as needed for Pain      Multiple Vitamins-Minerals (PRESERVISION AREDS PO) Take by mouth 2 times daily       clopidogrel (PLAVIX) 75 MG tablet Take 75 mg by mouth daily. allopurinol (ZYLOPRIM) 100 MG tablet Take 100 mg by mouth daily. finasteride (PROSCAR) 5 MG tablet Take 5 mg by mouth daily       glimepiride (AMARYL) 2 MG tablet Take 1 mg by mouth every morning (before breakfast). choline fenofibrate (TRILIPIX) 135 MG CPDR Take 135 mg by mouth daily.            STOP taking these medications budesonide-formoterol (SYMBICORT) 160-4.5 MCG/ACT AERO Comments:   Reason for Stopping:         atorvastatin (LIPITOR) 80 MG tablet Comments:   Reason for Stopping:         metformin (GLUMETZA) 1000 MG (MOD) ER tablet Comments:   Reason for Stopping:         enalapril (VASOTEC) 2.5 MG tablet Comments:   Reason for Stopping:             Activity: activity as tolerated  Diet: diabetic diet  Wound Care: none needed    Follow-up with PCP, Pulmonary, Cardiology in 1-2 weeks.     Signed:  Zbigniew Lion MD  4/22/2020  6:40 AM

## 2020-04-23 NOTE — CARE COORDINATION
contact (6 feet, which is about two arm lengths) with people who are sick.  Put distance between yourself and other people if COVID-19 is spreading in your community.  Clean and disinfect frequently touched surfaces.  Avoid all cruise travel and non-essential air travel.  Call your healthcare professional if you have concerns about COVID-19 and your underlying condition or if you are sick. For more information on steps you can take to protect yourself, see CDC's How to [place] for follow-up call in 5-7 days based on severity of symptoms and risk factors. Patient denies any s/s of covid-19. Patient reports he utilizes oxygen 3l/min via nc prn and hs. Oxygen safety reviewed with patient. Patient reports fbs 4/23/20 134 mg/dl. Per patient, his daughter Ramesh Ear his meds and she is not in home currently. Patient's daughter transports to [facility] and is getting groceries for patient. Patient gave permission to speak to [facility] for care coordination calls.     Follow Up  Future Appointments   Date Time Provider Tahmina Lopez   4/27/2020  1:15 PM [place], SEYZ LINAC 2 SEYZ Rad Onc [facility]   4/28/2020  1:15 PM SCHEDULE, SEYZ LINAC 2 SEYZ Rad Onc [facility]   4/29/2020  1:15 PM SCHEDULE, SEYZ LINAC 2 SEYZ Rad Onc [facility]   4/30/2020  1:15 PM SCHEDULE, SEYZ LINAC 2 SEYZ Rad Onc [facility]   5/1/2020  1:15 PM SCHEDULE, SEYZ LINAC 2 SEYZ Rad Onc [facility]   5/4/2020  1:15 PM SCHEDULE, SEYZ LINAC 2 SEYZ Rad Onc [facility]   5/5/2020  1:15 PM SCHEDULE, SEYZ LINAC 2 SEYZ Rad Onc [facility]   5/6/2020  1:15 PM SCHEDULE, SEYZ LINAC 2 SEYZ Rad Onc [facility]   5/7/2020  1:15 PM SCHEDULE, SEYZ LINAC 2 SEYZ Rad Onc [facility]   5/8/2020  1:15 PM SCHEDULE, SEYZ LINAC 2 SEYZ Rad Onc [facility]   5/11/2020  1:15 PM SCHEDULE, SEYZ LINAC 2 SEYZ Rad Onc [facility]   5/12/2020  1:15 PM SCHEDULE, SEYZ LINAC 2 SEYZ Rad Onc [facility]   5/13/2020  1:15 PM SCHEDULE, SEYZ LINAC 2 [address] [facility]   5/14/2020  1:15 PM SCHEDULE, SEYZ LINAC 2 SEYZ Rad Onc [facility]   5/15/2020  1:15 PM SCHEDULE, SEYZ LINAC 2 SEYZ Rad Onc [facility]   5/18/2020  1:15 PM SCHEDULE, SEYZ LINAC 2 SEYZ Rad Onc [facility]   5/19/2020  1:15 PM SCHEDULE, SEYZ LINAC 2 SEYZ Rad Onc [facility]   5/20/2020  1:15 PM SCHEDULE, SEYZ LINAC 2 SEYZ Rad Onc [facility]   5/21/2020  1:15 PM SCHEDULE, SEYZ LINAC 2 SEYZ Rad Onc [facility]   5/22/2020  1:15 PM SCHEDULE, SEYZ LINAC 2 SEYZ Rad Onc [facility]   5/26/2020  1:15 PM SCHEDULE, SEYZ LINAC 2 SEYZ Rad Onc [facility]   5/27/2020  1:15 PM SCHEDULE, SEYZ LINAC 2 SEYZ Rad Onc [facility]   5/28/2020  1:15 PM SCHEDULE, SEYZ LINAC 2 SEYZ Rad Onc [facility]   5/29/2020  1:15 PM SCHEDULE, SEYZ LINAC 2 SEYZ Rad Onc [facility]   6/1/2020  1:15 PM SCHEDULE, SEYZ LINAC 2 SEYZ Rad Onc [facility]   6/2/2020  1:15 PM SCHEDULE, SEYZ LINAC 2 SEYZ Rad Onc [facility]   6/3/2020  1:15 PM SCHEDULE, SEYZ LINAC 2 SEYZ Rad Onc [facility]   6/4/2020  1:15 PM SCHEDULE, SEYZ LINAC 2 SEYZ Rad Onc [address] [place] Naqvi

## 2020-04-29 NOTE — PATIENT INSTRUCTIONS
Continue daily fractionated radiation therapy as scheduled. Please see weekly OTV note and intial consultation letter in [facility] for clinical details. Guera Akers.  Ajit Santizo MD [address] [place] [address]  Radiation Oncology  [place]:  [phone]   FAX:    [id number] [facility]: [facility]:  [phone]

## 2020-04-29 NOTE — PROGRESS NOTES
DEPARTMENT OF RADIATION ONCOLOGY ON TREATMENT VISIT         4/29/2020      NAME:  Tana Chen Sr    YOB: 1934    Diagnosis: lung ca    SUBJECTIVE:   Tana Chen Sr has now received fractionated external beam radiation therapy - note: pt declined chemo. NCCN reviewed today with pt. Past medical, surgical, social and family histories reviewed and updated as indicated. Pain: controlled    ALLERGIES:  Patient has no known allergies. Current Outpatient Medications   Medication Sig Dispense Refill    ipratropium (ATROVENT) 0.02 % nebulizer solution Take 2.5 mLs by nebulization 4 times daily 2.5 mL 3    apixaban (ELIQUIS) 5 MG TABS tablet Take 1 tablet by mouth 2 times daily 60 tablet 0    metoprolol succinate (TOPROL XL) 50 MG extended release tablet Take 1 tablet by mouth 2 times daily 30 tablet 3    furosemide (LASIX) 20 MG tablet Take 1 tablet by mouth daily 60 tablet 3    predniSONE (DELTASONE) 10 MG tablet Take 1 tablet by mouth daily 30 tablet 0    Respiratory Therapy Supplies (FULL KIT NEBULIZER SET) MISC Use as directed with nebulized medication. 1 each 0    tamsulosin (FLOMAX) 0.4 MG capsule Take 0.4 mg by mouth daily       budesonide-formoterol (SYMBICORT) 80-4.5 MCG/ACT AERO Inhale 2 puffs into the lungs 2 times daily      ipratropium-albuterol (DUONEB) 0.5-2.5 (3) MG/3ML SOLN nebulizer solution Inhale 1 vial into the lungs 4 times daily      ipratropium (ATROVENT) 0.06 % nasal spray 2 sprays by Each Nostril route 2 times daily      albuterol sulfate  (90 Base) MCG/ACT inhaler Inhale 2 puffs into the lungs every 6 hours as needed for Wheezing      gabapentin (NEURONTIN) 300 MG capsule Take 300 mg by mouth 3 times daily.  Indications: 1 tab in AM , 2 tabs at night      acetaminophen (TYLENOL) 325 MG tablet Take 650 mg by mouth every 6 hours as needed for Pain      Multiple Vitamins-Minerals (PRESERVISION AREDS PO) Take by mouth 2 times daily      

## 2020-05-01 NOTE — CARE COORDINATION
Per April at Dr. [facility] office, patient to discontinue Amaryl and take Metformin 500 mg po bid. Med list updated. Paola Carrel updated regarding above orders. Paola Carrel reports patient probably already took Amaryl this morning; however, she will remove from [place] [id number] and patient will take Metformin 500 mg po bid with meals.

## 2020-05-12 NOTE — PROGRESS NOTES
RADIATION ONCOLOGY - WEEKLY ON TREATMENT VISIT      5/12/2020    NAME:  Laina Zamorano Sr    YOB: 1934    Diagnosis:  cT3-CT4, cN1-N2 Stage III Lung cancer     Subjective:   Laina Zamorano Sr has now received 2200 cGy in 11 fractions directed to the thorax for management of lung cancer alone without chemo now. No complaints. Minimal cough - non productive. No hemoptysis. Afebrile. Pain: None. Past medical, surgical, social and family histories reviewed and updated as indicated. ALLERGIES:  Patient has no known allergies. Current Outpatient Medications   Medication Sig Dispense Refill    metFORMIN (GLUCOPHAGE) 500 MG tablet Take 500 mg by mouth 2 times daily (with meals)      ipratropium (ATROVENT) 0.02 % nebulizer solution Take 2.5 mLs by nebulization 4 times daily 2.5 mL 3    apixaban (ELIQUIS) 5 MG TABS tablet Take 1 tablet by mouth 2 times daily 60 tablet 0    metoprolol succinate (TOPROL XL) 50 MG extended release tablet Take 1 tablet by mouth 2 times daily 30 tablet 3    furosemide (LASIX) 20 MG tablet Take 1 tablet by mouth daily 60 tablet 3    predniSONE (DELTASONE) 10 MG tablet Take 1 tablet by mouth daily 30 tablet 0    Respiratory Therapy Supplies (FULL KIT NEBULIZER SET) MISC Use as directed with nebulized medication. 1 each 0    tamsulosin (FLOMAX) 0.4 MG capsule Take 0.4 mg by mouth daily       budesonide-formoterol (SYMBICORT) 80-4.5 MCG/ACT AERO Inhale 2 puffs into the lungs 2 times daily      ipratropium-albuterol (DUONEB) 0.5-2.5 (3) MG/3ML SOLN nebulizer solution Inhale 1 vial into the lungs 4 times daily      ipratropium (ATROVENT) 0.06 % nasal spray 2 sprays by Each Nostril route 2 times daily      albuterol sulfate  (90 Base) MCG/ACT inhaler Inhale 2 puffs into the lungs every 6 hours as needed for Wheezing      gabapentin (NEURONTIN) 300 MG capsule Take 300 mg by mouth 3 times daily.  Indications: 1 tab in AM , 2 tabs at night      acetaminophen (TYLENOL) 325 MG tablet Take 650 mg by mouth every 6 hours as needed for Pain      Multiple Vitamins-Minerals (PRESERVISION AREDS PO) Take by mouth 2 times daily       clopidogrel (PLAVIX) 75 MG tablet Take 75 mg by mouth daily.  allopurinol (ZYLOPRIM) 100 MG tablet Take 100 mg by mouth daily.  finasteride (PROSCAR) 5 MG tablet Take 5 mg by mouth daily       choline fenofibrate (TRILIPIX) 135 MG CPDR Take 135 mg by mouth daily. No current facility-administered medications for this encounter. Physical Examination:  Alert, pleasant and conversant. Wt Readings from Last 3 Encounters:   05/12/20 208 lb 5 oz (94.5 kg)   05/05/20 209 lb 2 oz (94.9 kg)   04/29/20 206 lb (93.4 kg)     Irradiated skin shows no changes. ASSESSMENT/PLAN:  Patient is tolerating treatments well with expected toxicities. No questions. Current and planned dose reviewed. Goals of treatment and potential side effects were reviewed with the patient. Questions answered to apparent satisfaction. Treatment imaging has been personally reviewed for accuracy and precision. Treatments will continue as planned to 6000 cGy in 30 fractions.     Delvis Roy MD  Radiation Oncology    [place]:  [address]: [phone]

## 2020-05-20 NOTE — PROGRESS NOTES
Mague Duque Sr  5/20/2020  Wt Readings from Last 3 Encounters:   05/20/20 211 lb 2 oz (95.8 kg)   05/12/20 208 lb 5 oz (94.5 kg)   05/05/20 209 lb 2 oz (94.9 kg)     Body mass index is 28.63 kg/m². Treatment Area:Right lung and LN's    Patient was seen today for weekly visit. Comfort Alteration  KPS:60%  Fatigue: None    Ventilation Alterations  Cough: Yes/little bit  Hemoptysis: No  Mucus Color: na  Dyspnea: Yes  O2 Sat: 92%    Nutritional Alteration  Anorexia: No  Nausea: No   Vomiting: No     Skin Alteration   Sensation:na    Radiation Dermatitis:  na    Mucous Membrane Alteration  Voice Changes/ Stridor/Larynx: no  Pharynx & Esophagus: na    Elimination Alterations  Constipation: no  Diarrhea:  no      Emotional  Coping: effective      Injury, potential bleeding or infection: na    Other:na    Lab Results   Component Value Date    WBC 10.1 04/22/2020     04/22/2020         BP (!) 112/58   Pulse 87   Temp 97.5 °F (36.4 °C) (Tympanic)   Resp 20   Wt 211 lb 2 oz (95.8 kg)   SpO2 92%   BMI 28.63 kg/m²   BP within normal range? yes         Assessment/Plan:  Completed 17/30 fractions, 3400/6000 cGy. Complaints of having difficulty walking, uses a walker at home.     Les Lucas

## 2020-05-29 ENCOUNTER — APPOINTMENT (OUTPATIENT)
Dept: RADIATION ONCOLOGY | Age: 85
End: 2020-05-29
Attending: RADIOLOGY
Payer: MEDICARE

## 2020-06-03 ENCOUNTER — HOSPITAL ENCOUNTER (OUTPATIENT)
Dept: RADIATION ONCOLOGY | Age: 85
Discharge: HOME | End: 2020-06-03
Attending: RADIOLOGY
Payer: MEDICARE

## 2020-06-03 PROCEDURE — 99999 PR OFFICE/OUTPT VISIT,PROCEDURE ONLY: CPT | Performed by: RADIOLOGY
